# Patient Record
Sex: MALE | Race: BLACK OR AFRICAN AMERICAN | Employment: PART TIME | URBAN - METROPOLITAN AREA
[De-identification: names, ages, dates, MRNs, and addresses within clinical notes are randomized per-mention and may not be internally consistent; named-entity substitution may affect disease eponyms.]

---

## 2022-09-20 VITALS
BODY MASS INDEX: 50.62 KG/M2 | RESPIRATION RATE: 20 BRPM | WEIGHT: 315 LBS | HEIGHT: 66 IN | DIASTOLIC BLOOD PRESSURE: 109 MMHG | HEART RATE: 97 BPM | SYSTOLIC BLOOD PRESSURE: 197 MMHG | TEMPERATURE: 98.2 F | OXYGEN SATURATION: 95 %

## 2022-09-20 PROCEDURE — 99283 EMERGENCY DEPT VISIT LOW MDM: CPT

## 2022-09-20 ASSESSMENT — LIFESTYLE VARIABLES
HOW MANY STANDARD DRINKS CONTAINING ALCOHOL DO YOU HAVE ON A TYPICAL DAY: PATIENT DOES NOT DRINK
HOW OFTEN DO YOU HAVE A DRINK CONTAINING ALCOHOL: NEVER

## 2022-09-20 ASSESSMENT — PAIN - FUNCTIONAL ASSESSMENT: PAIN_FUNCTIONAL_ASSESSMENT: 0-10

## 2022-09-20 ASSESSMENT — PAIN DESCRIPTION - LOCATION: LOCATION: OTHER (COMMENT)

## 2022-09-20 ASSESSMENT — PAIN SCALES - GENERAL: PAINLEVEL_OUTOF10: 10

## 2022-09-20 ASSESSMENT — PAIN DESCRIPTION - ORIENTATION: ORIENTATION: RIGHT

## 2022-09-21 ENCOUNTER — HOSPITAL ENCOUNTER (EMERGENCY)
Age: 42
Discharge: HOME | End: 2022-09-21
Attending: EMERGENCY MEDICINE

## 2022-09-21 DIAGNOSIS — L03.313 CELLULITIS OF CHEST WALL: Primary | ICD-10-CM

## 2022-09-21 PROCEDURE — 6370000000 HC RX 637 (ALT 250 FOR IP): Performed by: EMERGENCY MEDICINE

## 2022-09-21 RX ORDER — SULFAMETHOXAZOLE AND TRIMETHOPRIM 800; 160 MG/1; MG/1
1 TABLET ORAL
Status: COMPLETED | OUTPATIENT
Start: 2022-09-21 | End: 2022-09-21

## 2022-09-21 RX ORDER — CEPHALEXIN 500 MG/1
500 CAPSULE ORAL
Status: COMPLETED | OUTPATIENT
Start: 2022-09-21 | End: 2022-09-21

## 2022-09-21 RX ORDER — SULFAMETHOXAZOLE AND TRIMETHOPRIM 800; 160 MG/1; MG/1
1 TABLET ORAL 2 TIMES DAILY
Qty: 14 TABLET | Refills: 0 | Status: ON HOLD | OUTPATIENT
Start: 2022-09-21 | End: 2022-09-28 | Stop reason: HOSPADM

## 2022-09-21 RX ORDER — CEPHALEXIN 500 MG/1
500 CAPSULE ORAL 4 TIMES DAILY
Qty: 28 CAPSULE | Refills: 0 | Status: ON HOLD | OUTPATIENT
Start: 2022-09-21 | End: 2022-09-28 | Stop reason: HOSPADM

## 2022-09-21 RX ORDER — NAPROXEN 250 MG/1
250 TABLET ORAL
Status: COMPLETED | OUTPATIENT
Start: 2022-09-21 | End: 2022-09-21

## 2022-09-21 RX ADMIN — CEPHALEXIN 500 MG: 500 CAPSULE ORAL at 01:38

## 2022-09-21 RX ADMIN — NAPROXEN 250 MG: 250 TABLET ORAL at 01:38

## 2022-09-21 RX ADMIN — SULFAMETHOXAZOLE AND TRIMETHOPRIM 1 TABLET: 800; 160 TABLET ORAL at 01:38

## 2022-09-21 ASSESSMENT — PAIN SCALES - GENERAL: PAINLEVEL_OUTOF10: 10

## 2022-09-21 ASSESSMENT — PAIN DESCRIPTION - ORIENTATION: ORIENTATION: RIGHT

## 2022-09-21 ASSESSMENT — ENCOUNTER SYMPTOMS
COUGH: 0
COLOR CHANGE: 1
SHORTNESS OF BREATH: 0

## 2022-09-21 ASSESSMENT — PAIN DESCRIPTION - LOCATION: LOCATION: RIB CAGE

## 2022-09-21 NOTE — ED NOTES
I have reviewed discharge instructions with the patient. The patient verbalized understanding. Patient left ED via Discharge Method: ambulatory to Home with family. Opportunity for questions and clarification provided. Patient given 2 scripts. To continue your aftercare when you leave the hospital, you may receive an automated call from our care team to check in on how you are doing. This is a free service and part of our promise to provide the best care and service to meet your aftercare needs.  If you have questions, or wish to unsubscribe from this service please call [phone]. Thank you for Choosing our [facility] Emergency Department.         Derik Arroyo RN  09/21/22 5978

## 2022-09-21 NOTE — ED TRIAGE NOTES
Pt c/o cyst on R armpit going down R side for about 2 days. Pt states he has had cyst removed before.

## 2022-09-21 NOTE — DISCHARGE INSTRUCTIONS
As we discussed, it is important for you to have this area reevaluated in 2 or 3 days. Please follow-up with your primary care doctor or return here to have that done. Please return sooner with any fevers, vomiting, worsening symptoms, or additional concerns.

## 2022-09-21 NOTE — ED PROVIDER NOTES
Emergency Department Provider Note                   PCP:                None Provider               Age: 37 y.o. Sex: male     No diagnosis found. DISPOSITION          MDM  Number of Diagnoses or Management Options  Diagnosis management comments: At this time his exam is most consistent with a cellulitis. Given his obesity I am concerned for possible MRSA so we will treat with a combination of Keflex and Bactrim. No orders of the defined types were placed in this encounter. Medications   cephALEXin (KEFLEX) capsule 500 mg (has no administration in time range)   sulfamethoxazole-trimethoprim (BACTRIM DS;SEPTRA DS) 800-160 MG per tablet 1 tablet (has no administration in time range)   naproxen (NAPROSYN) tablet 250 mg (has no administration in time range)       New Prescriptions    No medications on file        Cecil Milner is a 37 y.o. male who presents to the Emergency Department with chief complaint of    Chief Complaint   Patient presents with    Cyst      81-year-old gentleman presents with concerns about redness and tenderness in the fold under his right chest.  He said he is concerned that he might be developing an abscess. He says with this it is very painful. He has had no fevers. He notes that there is some redness there. He says he does have a history of high blood pressure but no history of diabetes. He says he had previous abscesses in that location. No other associated symptoms. Elements of this note were created using speech recognition software. As such, errors of speech recognition may be present. Review of Systems   Constitutional:  Negative for chills and fever. Respiratory:  Negative for cough and shortness of breath. Musculoskeletal:  Negative for arthralgias, joint swelling and myalgias. Skin:  Positive for color change. Negative for wound.      Past Medical History:   Diagnosis Date    Hypertension         Past Surgical History: Procedure Laterality Date    CYST REMOVAL      TONSILLECTOMY          History reviewed. No pertinent family history. Social History     Socioeconomic History    Marital status: Single     Spouse name: None    Number of children: None    Years of education: None    Highest education level: None   Tobacco Use    Smoking status: Never    Smokeless tobacco: Never   Substance and Sexual Activity    Alcohol use: Never    Drug use: Never         Patient has no known allergies. Previous Medications    No medications on file        Vitals signs and nursing note reviewed. Patient Vitals for the past 4 hrs:   Temp Pulse Resp BP SpO2   09/20/22 2346 98.2 °F (36.8 °C) 97 20 (!) 197/109 95 %          Physical Exam  Vitals and nursing note reviewed. Constitutional:       Appearance: Normal appearance. Cardiovascular:      Rate and Rhythm: Normal rate. Pulmonary:      Effort: Pulmonary effort is normal.   Skin:     Comments: Erythema and tenderness in the fold under his right chest.  I do not feel any abscess, fluctuance, or fluid collection. Neurological:      Mental Status: He is alert. Procedures    No results found for any visits on 09/21/22. No orders to display                       Voice dictation software was used during the making of this note. This software is not perfect and grammatical and other typographical errors may be present. This note has not been completely proofread for errors.         Vickie Banks MD  09/21/22 4740

## 2022-09-22 ENCOUNTER — HOSPITAL ENCOUNTER (INPATIENT)
Age: 42
LOS: 6 days | Discharge: HOME HEALTH | DRG: 871 | End: 2022-09-29
Attending: STUDENT IN AN ORGANIZED HEALTH CARE EDUCATION/TRAINING PROGRAM | Admitting: INTERNAL MEDICINE

## 2022-09-22 ENCOUNTER — HOSPITAL ENCOUNTER (EMERGENCY)
Dept: GENERAL RADIOLOGY | Age: 42
Discharge: HOME | DRG: 871 | End: 2022-09-25

## 2022-09-22 ENCOUNTER — APPOINTMENT (OUTPATIENT)
Dept: CT IMAGING | Age: 42
DRG: 871 | End: 2022-09-22

## 2022-09-22 DIAGNOSIS — L03.311 ABDOMINAL WALL CELLULITIS: Primary | ICD-10-CM

## 2022-09-22 DIAGNOSIS — A41.9 SEPTICEMIA (HCC): ICD-10-CM

## 2022-09-22 DIAGNOSIS — I15.9 SECONDARY HYPERTENSION: ICD-10-CM

## 2022-09-22 DIAGNOSIS — I10 HYPERTENSION, UNSPECIFIED TYPE: ICD-10-CM

## 2022-09-22 LAB
ALBUMIN SERPL-MCNC: 2.9 G/DL (ref 3.5–5)
ALBUMIN/GLOB SERPL: 0.5 {RATIO} (ref 1.2–3.5)
ALP SERPL-CCNC: 78 U/L (ref 50–136)
ALT SERPL-CCNC: 10 U/L (ref 12–65)
ANION GAP SERPL CALC-SCNC: 4 MMOL/L (ref 4–13)
AST SERPL-CCNC: 9 U/L (ref 15–37)
BASOPHILS # BLD: 0 K/UL (ref 0–0.2)
BASOPHILS NFR BLD: 0 % (ref 0–2)
BILIRUB SERPL-MCNC: 1.8 MG/DL (ref 0.2–1.1)
BUN SERPL-MCNC: 17 MG/DL (ref 6–23)
CALCIUM SERPL-MCNC: 8.9 MG/DL (ref 8.3–10.4)
CHLORIDE SERPL-SCNC: 98 MMOL/L (ref 101–110)
CO2 SERPL-SCNC: 33 MMOL/L (ref 21–32)
CREAT SERPL-MCNC: 1.4 MG/DL (ref 0.8–1.5)
DIFFERENTIAL METHOD BLD: ABNORMAL
EKG ATRIAL RATE: 99 BPM
EKG DIAGNOSIS: NORMAL
EKG P AXIS: 55 DEGREES
EKG P-R INTERVAL: 148 MS
EKG Q-T INTERVAL: 378 MS
EKG QRS DURATION: 92 MS
EKG QTC CALCULATION (BAZETT): 485 MS
EKG R AXIS: 96 DEGREES
EKG T AXIS: 10 DEGREES
EKG VENTRICULAR RATE: 99 BPM
EOSINOPHIL # BLD: 0.2 K/UL (ref 0–0.8)
EOSINOPHIL NFR BLD: 1 % (ref 0.5–7.8)
ERYTHROCYTE [DISTWIDTH] IN BLOOD BY AUTOMATED COUNT: 13.9 % (ref 11.9–14.6)
GLOBULIN SER CALC-MCNC: 5.6 G/DL (ref 2.3–3.5)
GLUCOSE SERPL-MCNC: 102 MG/DL (ref 65–100)
HCT VFR BLD AUTO: 42 % (ref 41.1–50.3)
HGB BLD-MCNC: 13.8 G/DL (ref 13.6–17.2)
IMM GRANULOCYTES # BLD AUTO: 0.2 K/UL (ref 0–0.5)
IMM GRANULOCYTES NFR BLD AUTO: 1 % (ref 0–5)
LACTATE SERPL-SCNC: 0.9 MMOL/L (ref 0.4–2)
LYMPHOCYTES # BLD: 1.4 K/UL (ref 0.5–4.6)
LYMPHOCYTES NFR BLD: 7 % (ref 13–44)
MCH RBC QN AUTO: 30.2 PG (ref 26.1–32.9)
MCHC RBC AUTO-ENTMCNC: 32.9 G/DL (ref 31.4–35)
MCV RBC AUTO: 91.9 FL (ref 79.6–97.8)
MONOCYTES # BLD: 1.7 K/UL (ref 0.1–1.3)
MONOCYTES NFR BLD: 9 % (ref 4–12)
NEUTS SEG # BLD: 16.1 K/UL (ref 1.7–8.2)
NEUTS SEG NFR BLD: 82 % (ref 43–78)
NRBC # BLD: 0 K/UL (ref 0–0.2)
PLATELET # BLD AUTO: 276 K/UL (ref 150–450)
PMV BLD AUTO: 9.7 FL (ref 9.4–12.3)
POTASSIUM SERPL-SCNC: 3.2 MMOL/L (ref 3.5–5.1)
PROCALCITONIN SERPL-MCNC: 0.28 NG/ML (ref 0–0.49)
PROT SERPL-MCNC: 8.5 G/DL (ref 6.3–8.2)
RBC # BLD AUTO: 4.57 M/UL (ref 4.23–5.6)
SODIUM SERPL-SCNC: 135 MMOL/L (ref 136–145)
WBC # BLD AUTO: 19.5 K/UL (ref 4.3–11.1)

## 2022-09-22 PROCEDURE — 80053 COMPREHEN METABOLIC PANEL: CPT

## 2022-09-22 PROCEDURE — 85025 COMPLETE CBC W/AUTO DIFF WBC: CPT

## 2022-09-22 PROCEDURE — 84145 PROCALCITONIN (PCT): CPT

## 2022-09-22 PROCEDURE — 2580000003 HC RX 258: Performed by: EMERGENCY MEDICINE

## 2022-09-22 PROCEDURE — 71045 X-RAY EXAM CHEST 1 VIEW: CPT

## 2022-09-22 PROCEDURE — 6360000002 HC RX W HCPCS: Performed by: STUDENT IN AN ORGANIZED HEALTH CARE EDUCATION/TRAINING PROGRAM

## 2022-09-22 PROCEDURE — 96366 THER/PROPH/DIAG IV INF ADDON: CPT

## 2022-09-22 PROCEDURE — 87040 BLOOD CULTURE FOR BACTERIA: CPT

## 2022-09-22 PROCEDURE — 96365 THER/PROPH/DIAG IV INF INIT: CPT

## 2022-09-22 PROCEDURE — 93005 ELECTROCARDIOGRAM TRACING: CPT | Performed by: EMERGENCY MEDICINE

## 2022-09-22 PROCEDURE — 6370000000 HC RX 637 (ALT 250 FOR IP): Performed by: EMERGENCY MEDICINE

## 2022-09-22 PROCEDURE — 83605 ASSAY OF LACTIC ACID: CPT

## 2022-09-22 PROCEDURE — 2580000003 HC RX 258: Performed by: STUDENT IN AN ORGANIZED HEALTH CARE EDUCATION/TRAINING PROGRAM

## 2022-09-22 PROCEDURE — 6360000004 HC RX CONTRAST MEDICATION: Performed by: STUDENT IN AN ORGANIZED HEALTH CARE EDUCATION/TRAINING PROGRAM

## 2022-09-22 PROCEDURE — 99285 EMERGENCY DEPT VISIT HI MDM: CPT

## 2022-09-22 PROCEDURE — 96361 HYDRATE IV INFUSION ADD-ON: CPT

## 2022-09-22 PROCEDURE — 6360000002 HC RX W HCPCS: Performed by: EMERGENCY MEDICINE

## 2022-09-22 PROCEDURE — 71260 CT THORAX DX C+: CPT

## 2022-09-22 PROCEDURE — 96375 TX/PRO/DX INJ NEW DRUG ADDON: CPT

## 2022-09-22 RX ORDER — ACETAMINOPHEN 500 MG
1000 TABLET ORAL
Status: COMPLETED | OUTPATIENT
Start: 2022-09-22 | End: 2022-09-22

## 2022-09-22 RX ORDER — ACETAMINOPHEN 325 MG/1
650 TABLET ORAL
Status: DISCONTINUED | OUTPATIENT
Start: 2022-09-22 | End: 2022-09-22

## 2022-09-22 RX ORDER — 0.9 % SODIUM CHLORIDE 0.9 %
1000 INTRAVENOUS SOLUTION INTRAVENOUS
Status: COMPLETED | OUTPATIENT
Start: 2022-09-22 | End: 2022-09-23

## 2022-09-22 RX ORDER — SODIUM CHLORIDE 0.9 % (FLUSH) 0.9 %
10 SYRINGE (ML) INJECTION
Status: DISCONTINUED | OUTPATIENT
Start: 2022-09-22 | End: 2022-09-23

## 2022-09-22 RX ORDER — HYDROMORPHONE HYDROCHLORIDE 1 MG/ML
1 INJECTION, SOLUTION INTRAMUSCULAR; INTRAVENOUS; SUBCUTANEOUS
Status: COMPLETED | OUTPATIENT
Start: 2022-09-22 | End: 2022-09-22

## 2022-09-22 RX ORDER — 0.9 % SODIUM CHLORIDE 0.9 %
100 INTRAVENOUS SOLUTION INTRAVENOUS
Status: DISCONTINUED | OUTPATIENT
Start: 2022-09-22 | End: 2022-09-23

## 2022-09-22 RX ORDER — ONDANSETRON 2 MG/ML
4 INJECTION INTRAMUSCULAR; INTRAVENOUS
Status: COMPLETED | OUTPATIENT
Start: 2022-09-22 | End: 2022-09-22

## 2022-09-22 RX ORDER — SODIUM CHLORIDE, SODIUM LACTATE, POTASSIUM CHLORIDE, AND CALCIUM CHLORIDE .6; .31; .03; .02 G/100ML; G/100ML; G/100ML; G/100ML
30 INJECTION, SOLUTION INTRAVENOUS ONCE
Status: COMPLETED | OUTPATIENT
Start: 2022-09-22 | End: 2022-09-22

## 2022-09-22 RX ADMIN — ONDANSETRON 4 MG: 2 INJECTION INTRAMUSCULAR; INTRAVENOUS at 23:39

## 2022-09-22 RX ADMIN — IOPAMIDOL 100 ML: 755 INJECTION, SOLUTION INTRAVENOUS at 23:49

## 2022-09-22 RX ADMIN — SODIUM CHLORIDE, POTASSIUM CHLORIDE, SODIUM LACTATE AND CALCIUM CHLORIDE 1914 ML: 600; 310; 30; 20 INJECTION, SOLUTION INTRAVENOUS at 19:24

## 2022-09-22 RX ADMIN — SODIUM CHLORIDE 1000 ML: 9 INJECTION, SOLUTION INTRAVENOUS at 23:38

## 2022-09-22 RX ADMIN — ACETAMINOPHEN 1000 MG: 500 TABLET, FILM COATED ORAL at 19:24

## 2022-09-22 RX ADMIN — HYDROMORPHONE HYDROCHLORIDE 1 MG: 1 INJECTION, SOLUTION INTRAMUSCULAR; INTRAVENOUS; SUBCUTANEOUS at 23:39

## 2022-09-22 RX ADMIN — PIPERACILLIN AND TAZOBACTAM 4500 MG: 4; .5 INJECTION, POWDER, FOR SOLUTION INTRAVENOUS at 19:25

## 2022-09-22 ASSESSMENT — PAIN SCALES - GENERAL
PAINLEVEL_OUTOF10: 10
PAINLEVEL_OUTOF10: 10

## 2022-09-22 NOTE — ED TRIAGE NOTES
Patient ambulatory to triage with mask in place. Patient reports he has an abscess under his left arm. Pt reports he was prescribed ABX 2 days ago and it still has been hurting.

## 2022-09-22 NOTE — ED NOTES
Blood Culture drawn and 2nd staff member assisted (audited) Drawn by Sandra Baer RN witnessed by Brianda Grissom.        Zeke Krishnamurthy RN  09/22/22 5962

## 2022-09-23 PROBLEM — E66.01 MORBID OBESITY WITH BMI OF 70 AND OVER, ADULT (HCC): Chronic | Status: ACTIVE | Noted: 2022-09-23

## 2022-09-23 PROBLEM — L03.111 CELLULITIS OF RIGHT AXILLA: Status: ACTIVE | Noted: 2022-09-23

## 2022-09-23 PROBLEM — A41.9 SEPSIS (HCC): Status: ACTIVE | Noted: 2022-09-23

## 2022-09-23 LAB
ANION GAP SERPL CALC-SCNC: 8 MMOL/L (ref 4–13)
BASOPHILS # BLD: 0 K/UL (ref 0–0.2)
BASOPHILS NFR BLD: 0 % (ref 0–2)
BUN SERPL-MCNC: 11 MG/DL (ref 6–23)
CALCIUM SERPL-MCNC: 8.9 MG/DL (ref 8.3–10.4)
CHLORIDE SERPL-SCNC: 101 MMOL/L (ref 101–110)
CO2 SERPL-SCNC: 27 MMOL/L (ref 21–32)
CREAT SERPL-MCNC: 1.2 MG/DL (ref 0.8–1.5)
DIFFERENTIAL METHOD BLD: ABNORMAL
EOSINOPHIL # BLD: 0 K/UL (ref 0–0.8)
EOSINOPHIL NFR BLD: 0 % (ref 0.5–7.8)
ERYTHROCYTE [DISTWIDTH] IN BLOOD BY AUTOMATED COUNT: 14 % (ref 11.9–14.6)
GLUCOSE SERPL-MCNC: 135 MG/DL (ref 65–100)
HCT VFR BLD AUTO: 45.8 % (ref 41.1–50.3)
HGB BLD-MCNC: 15 G/DL (ref 13.6–17.2)
IMM GRANULOCYTES # BLD AUTO: 0.5 K/UL (ref 0–0.5)
IMM GRANULOCYTES NFR BLD AUTO: 2 % (ref 0–5)
LYMPHOCYTES # BLD: 1 K/UL (ref 0.5–4.6)
LYMPHOCYTES NFR BLD: 4 % (ref 13–44)
MCH RBC QN AUTO: 29.7 PG (ref 26.1–32.9)
MCHC RBC AUTO-ENTMCNC: 32.8 G/DL (ref 31.4–35)
MCV RBC AUTO: 90.7 FL (ref 79.6–97.8)
MONOCYTES # BLD: 2.2 K/UL (ref 0.1–1.3)
MONOCYTES NFR BLD: 9 % (ref 4–12)
NEUTS SEG # BLD: 20.8 K/UL (ref 1.7–8.2)
NEUTS SEG NFR BLD: 85 % (ref 43–78)
NRBC # BLD: 0 K/UL (ref 0–0.2)
PLATELET # BLD AUTO: 224 K/UL (ref 150–450)
PLATELET COMMENT: ADEQUATE
PMV BLD AUTO: 10.2 FL (ref 9.4–12.3)
POTASSIUM SERPL-SCNC: 3.8 MMOL/L (ref 3.5–5.1)
RBC # BLD AUTO: 5.05 M/UL (ref 4.23–5.6)
RBC MORPH BLD: ABNORMAL
SODIUM SERPL-SCNC: 136 MMOL/L (ref 138–145)
WBC # BLD AUTO: 24.5 K/UL (ref 4.3–11.1)
WBC MORPH BLD: ABNORMAL

## 2022-09-23 PROCEDURE — 6360000002 HC RX W HCPCS: Performed by: INTERNAL MEDICINE

## 2022-09-23 PROCEDURE — 6370000000 HC RX 637 (ALT 250 FOR IP): Performed by: INTERNAL MEDICINE

## 2022-09-23 PROCEDURE — 96376 TX/PRO/DX INJ SAME DRUG ADON: CPT

## 2022-09-23 PROCEDURE — 6360000002 HC RX W HCPCS: Performed by: EMERGENCY MEDICINE

## 2022-09-23 PROCEDURE — 85025 COMPLETE CBC W/AUTO DIFF WBC: CPT

## 2022-09-23 PROCEDURE — 2500000003 HC RX 250 WO HCPCS: Performed by: HOSPITALIST

## 2022-09-23 PROCEDURE — 2500000003 HC RX 250 WO HCPCS: Performed by: FAMILY MEDICINE

## 2022-09-23 PROCEDURE — 80048 BASIC METABOLIC PNL TOTAL CA: CPT

## 2022-09-23 PROCEDURE — 96366 THER/PROPH/DIAG IV INF ADDON: CPT

## 2022-09-23 PROCEDURE — 96367 TX/PROPH/DG ADDL SEQ IV INF: CPT

## 2022-09-23 PROCEDURE — 2580000003 HC RX 258: Performed by: INTERNAL MEDICINE

## 2022-09-23 PROCEDURE — 1100000003 HC PRIVATE W/ TELEMETRY

## 2022-09-23 PROCEDURE — 6360000002 HC RX W HCPCS: Performed by: STUDENT IN AN ORGANIZED HEALTH CARE EDUCATION/TRAINING PROGRAM

## 2022-09-23 PROCEDURE — 36415 COLL VENOUS BLD VENIPUNCTURE: CPT

## 2022-09-23 PROCEDURE — 2580000003 HC RX 258: Performed by: FAMILY MEDICINE

## 2022-09-23 PROCEDURE — 2580000003 HC RX 258: Performed by: EMERGENCY MEDICINE

## 2022-09-23 PROCEDURE — 96375 TX/PRO/DX INJ NEW DRUG ADDON: CPT

## 2022-09-23 PROCEDURE — 6360000002 HC RX W HCPCS: Performed by: FAMILY MEDICINE

## 2022-09-23 RX ORDER — ACETAMINOPHEN 325 MG/1
650 TABLET ORAL EVERY 6 HOURS PRN
Status: DISCONTINUED | OUTPATIENT
Start: 2022-09-23 | End: 2022-09-29 | Stop reason: HOSPADM

## 2022-09-23 RX ORDER — HYDROCHLOROTHIAZIDE 25 MG/1
25 TABLET ORAL DAILY
Status: ON HOLD | COMMUNITY
Start: 2021-11-29 | End: 2022-09-29 | Stop reason: HOSPADM

## 2022-09-23 RX ORDER — ONDANSETRON 2 MG/ML
4 INJECTION INTRAMUSCULAR; INTRAVENOUS EVERY 6 HOURS PRN
Status: DISCONTINUED | OUTPATIENT
Start: 2022-09-23 | End: 2022-09-29 | Stop reason: HOSPADM

## 2022-09-23 RX ORDER — SPIRONOLACTONE 25 MG/1
50 TABLET ORAL DAILY
Status: DISCONTINUED | OUTPATIENT
Start: 2022-09-23 | End: 2022-09-29 | Stop reason: HOSPADM

## 2022-09-23 RX ORDER — HYDRALAZINE HYDROCHLORIDE 20 MG/ML
20 INJECTION INTRAMUSCULAR; INTRAVENOUS EVERY 6 HOURS PRN
Status: DISCONTINUED | OUTPATIENT
Start: 2022-09-23 | End: 2022-09-23

## 2022-09-23 RX ORDER — SODIUM CHLORIDE 0.9 % (FLUSH) 0.9 %
5-40 SYRINGE (ML) INJECTION PRN
Status: DISCONTINUED | OUTPATIENT
Start: 2022-09-23 | End: 2022-09-29 | Stop reason: HOSPADM

## 2022-09-23 RX ORDER — POLYETHYLENE GLYCOL 3350 17 G/17G
17 POWDER, FOR SOLUTION ORAL DAILY PRN
Status: DISCONTINUED | OUTPATIENT
Start: 2022-09-23 | End: 2022-09-29 | Stop reason: HOSPADM

## 2022-09-23 RX ORDER — LABETALOL HYDROCHLORIDE 5 MG/ML
10 INJECTION, SOLUTION INTRAVENOUS EVERY 6 HOURS PRN
Status: DISCONTINUED | OUTPATIENT
Start: 2022-09-23 | End: 2022-09-25 | Stop reason: HOSPADM

## 2022-09-23 RX ORDER — ENOXAPARIN SODIUM 100 MG/ML
60 INJECTION SUBCUTANEOUS 2 TIMES DAILY
Status: DISCONTINUED | OUTPATIENT
Start: 2022-09-23 | End: 2022-09-29 | Stop reason: HOSPADM

## 2022-09-23 RX ORDER — HYDROMORPHONE HYDROCHLORIDE 1 MG/ML
1 INJECTION, SOLUTION INTRAMUSCULAR; INTRAVENOUS; SUBCUTANEOUS
Status: COMPLETED | OUTPATIENT
Start: 2022-09-23 | End: 2022-09-23

## 2022-09-23 RX ORDER — HYDROCODONE BITARTRATE AND ACETAMINOPHEN 10; 325 MG/1; MG/1
1 TABLET ORAL EVERY 6 HOURS PRN
Status: DISCONTINUED | OUTPATIENT
Start: 2022-09-23 | End: 2022-09-29 | Stop reason: HOSPADM

## 2022-09-23 RX ORDER — HYDROMORPHONE HYDROCHLORIDE 1 MG/ML
1 INJECTION, SOLUTION INTRAMUSCULAR; INTRAVENOUS; SUBCUTANEOUS EVERY 4 HOURS PRN
Status: DISCONTINUED | OUTPATIENT
Start: 2022-09-23 | End: 2022-09-24

## 2022-09-23 RX ORDER — AMLODIPINE BESYLATE 10 MG/1
10 TABLET ORAL DAILY
Status: DISCONTINUED | OUTPATIENT
Start: 2022-09-23 | End: 2022-09-29 | Stop reason: HOSPADM

## 2022-09-23 RX ORDER — SODIUM CHLORIDE 0.9 % (FLUSH) 0.9 %
5-40 SYRINGE (ML) INJECTION EVERY 12 HOURS SCHEDULED
Status: DISCONTINUED | OUTPATIENT
Start: 2022-09-23 | End: 2022-09-29 | Stop reason: HOSPADM

## 2022-09-23 RX ORDER — ONDANSETRON 4 MG/1
4 TABLET, ORALLY DISINTEGRATING ORAL EVERY 8 HOURS PRN
Status: DISCONTINUED | OUTPATIENT
Start: 2022-09-23 | End: 2022-09-29 | Stop reason: HOSPADM

## 2022-09-23 RX ORDER — LOSARTAN POTASSIUM 50 MG/1
50 TABLET ORAL DAILY
Status: DISCONTINUED | OUTPATIENT
Start: 2022-09-23 | End: 2022-09-28

## 2022-09-23 RX ORDER — AMLODIPINE BESYLATE 10 MG/1
10 TABLET ORAL DAILY
COMMUNITY
Start: 2021-11-29

## 2022-09-23 RX ORDER — ACETAMINOPHEN 650 MG/1
650 SUPPOSITORY RECTAL EVERY 6 HOURS PRN
Status: DISCONTINUED | OUTPATIENT
Start: 2022-09-23 | End: 2022-09-29 | Stop reason: HOSPADM

## 2022-09-23 RX ORDER — POTASSIUM CHLORIDE 20 MEQ/1
20 TABLET, EXTENDED RELEASE ORAL
Status: DISPENSED | OUTPATIENT
Start: 2022-09-23 | End: 2022-09-26

## 2022-09-23 RX ORDER — HYDROCHLOROTHIAZIDE 25 MG/1
25 TABLET ORAL DAILY
Status: DISCONTINUED | OUTPATIENT
Start: 2022-09-23 | End: 2022-09-29

## 2022-09-23 RX ORDER — SODIUM CHLORIDE 9 MG/ML
INJECTION, SOLUTION INTRAVENOUS PRN
Status: DISCONTINUED | OUTPATIENT
Start: 2022-09-23 | End: 2022-09-29 | Stop reason: HOSPADM

## 2022-09-23 RX ADMIN — LOSARTAN POTASSIUM 50 MG: 50 TABLET, FILM COATED ORAL at 08:53

## 2022-09-23 RX ADMIN — HYDROMORPHONE HYDROCHLORIDE 1 MG: 1 INJECTION, SOLUTION INTRAMUSCULAR; INTRAVENOUS; SUBCUTANEOUS at 01:57

## 2022-09-23 RX ADMIN — SODIUM CHLORIDE 2000 MG: 9 INJECTION, SOLUTION INTRAVENOUS at 01:43

## 2022-09-23 RX ADMIN — HYDROMORPHONE HYDROCHLORIDE 1 MG: 1 INJECTION, SOLUTION INTRAMUSCULAR; INTRAVENOUS; SUBCUTANEOUS at 21:19

## 2022-09-23 RX ADMIN — PIPERACILLIN AND TAZOBACTAM 4500 MG: 4; .5 INJECTION, POWDER, LYOPHILIZED, FOR SOLUTION INTRAVENOUS at 18:17

## 2022-09-23 RX ADMIN — ACETAMINOPHEN 650 MG: 325 TABLET ORAL at 21:19

## 2022-09-23 RX ADMIN — TUBERCULIN PURIFIED PROTEIN DERIVATIVE 5 UNITS: 5 INJECTION, SOLUTION INTRADERMAL at 12:41

## 2022-09-23 RX ADMIN — HYDROCHLOROTHIAZIDE 25 MG: 25 TABLET ORAL at 08:52

## 2022-09-23 RX ADMIN — ENOXAPARIN SODIUM 60 MG: 100 INJECTION SUBCUTANEOUS at 09:08

## 2022-09-23 RX ADMIN — POTASSIUM CHLORIDE 20 MEQ: 1500 TABLET, EXTENDED RELEASE ORAL at 18:23

## 2022-09-23 RX ADMIN — POTASSIUM CHLORIDE 20 MEQ: 1500 TABLET, EXTENDED RELEASE ORAL at 12:36

## 2022-09-23 RX ADMIN — HYDRALAZINE HYDROCHLORIDE 20 MG: 20 INJECTION, SOLUTION INTRAMUSCULAR; INTRAVENOUS at 03:20

## 2022-09-23 RX ADMIN — HYDROMORPHONE HYDROCHLORIDE 1 MG: 1 INJECTION, SOLUTION INTRAMUSCULAR; INTRAVENOUS; SUBCUTANEOUS at 12:36

## 2022-09-23 RX ADMIN — AMLODIPINE BESYLATE 10 MG: 10 TABLET ORAL at 08:53

## 2022-09-23 RX ADMIN — HYDROMORPHONE HYDROCHLORIDE 1 MG: 1 INJECTION, SOLUTION INTRAMUSCULAR; INTRAVENOUS; SUBCUTANEOUS at 09:08

## 2022-09-23 RX ADMIN — HYDROCODONE BITARTRATE AND ACETAMINOPHEN 1 TABLET: 10; 325 TABLET ORAL at 15:34

## 2022-09-23 RX ADMIN — SODIUM CHLORIDE, PRESERVATIVE FREE 10 ML: 5 INJECTION INTRAVENOUS at 09:00

## 2022-09-23 RX ADMIN — PIPERACILLIN AND TAZOBACTAM 4500 MG: 4; .5 INJECTION, POWDER, FOR SOLUTION INTRAVENOUS at 13:06

## 2022-09-23 RX ADMIN — SPIRONOLACTONE 50 MG: 25 TABLET ORAL at 08:52

## 2022-09-23 RX ADMIN — POTASSIUM CHLORIDE 20 MEQ: 1500 TABLET, EXTENDED RELEASE ORAL at 08:52

## 2022-09-23 RX ADMIN — SODIUM CHLORIDE, PRESERVATIVE FREE 10 ML: 5 INJECTION INTRAVENOUS at 21:19

## 2022-09-23 RX ADMIN — VANCOMYCIN HYDROCHLORIDE 1250 MG: 10 INJECTION, POWDER, LYOPHILIZED, FOR SOLUTION INTRAVENOUS at 14:23

## 2022-09-23 RX ADMIN — LABETALOL HYDROCHLORIDE 10 MG: 5 INJECTION INTRAVENOUS at 21:18

## 2022-09-23 RX ADMIN — ENOXAPARIN SODIUM 60 MG: 100 INJECTION SUBCUTANEOUS at 21:18

## 2022-09-23 ASSESSMENT — ENCOUNTER SYMPTOMS
COLOR CHANGE: 1
CHEST TIGHTNESS: 0
SHORTNESS OF BREATH: 0
VOMITING: 0
CONSTIPATION: 0
WHEEZING: 0
COUGH: 0
ABDOMINAL DISTENTION: 0
NAUSEA: 0
ABDOMINAL PAIN: 0
BACK PAIN: 0

## 2022-09-23 ASSESSMENT — PAIN DESCRIPTION - LOCATION
LOCATION: OTHER (COMMENT)
LOCATION: BACK
LOCATION: OTHER (COMMENT)
LOCATION: OTHER (COMMENT)
LOCATION: ARM

## 2022-09-23 ASSESSMENT — PAIN SCALES - GENERAL
PAINLEVEL_OUTOF10: 0
PAINLEVEL_OUTOF10: 7
PAINLEVEL_OUTOF10: 2
PAINLEVEL_OUTOF10: 10
PAINLEVEL_OUTOF10: 0
PAINLEVEL_OUTOF10: 8
PAINLEVEL_OUTOF10: 2
PAINLEVEL_OUTOF10: 8

## 2022-09-23 ASSESSMENT — PAIN DESCRIPTION - DESCRIPTORS
DESCRIPTORS: SHARP
DESCRIPTORS: ACHING;DISCOMFORT;SHARP
DESCRIPTORS: SHARP

## 2022-09-23 ASSESSMENT — PAIN - FUNCTIONAL ASSESSMENT: PAIN_FUNCTIONAL_ASSESSMENT: PREVENTS OR INTERFERES SOME ACTIVE ACTIVITIES AND ADLS

## 2022-09-23 ASSESSMENT — PAIN DESCRIPTION - ORIENTATION
ORIENTATION: RIGHT;OUTER
ORIENTATION: RIGHT

## 2022-09-23 NOTE — CARE COORDINATION
Pt presented with a fever and R upper extremity pain. Patient reported that on 9/18/22 he noticed cysts with pain appearing in his right axilla with radiating pain toward his breast area. At that time he was evaluated at [place] ED and was found to have cellulitis. While at home, however, he spiked a fever and the pain intensified despite taking abx. Has a PMHx of morbid obesity, previous cellulitis, depression and HTN. Pt lives with his mother, sister and her children in a single story home. At baseline, is indep with his ADLs. Works [address] [employer]. On RA. Denies current home care services. Denies have a PCP and is self pay. CM will provided resources to NH and the [facility], in addition to [facility] for future medications. Will need the paper scripts at discharge to vouch the pts medications. Will remain available. 09/23/22 3316   Service Assessment   Patient Orientation Alert and Oriented   Cognition Alert   History Provided By Patient   Primary [address] Parent; Family Members;[employer];Friends/Neighbors   PCP Verified by CM No  (WIll provide info on NH and [facility])   Prior Functional Level Independent in ADLs/IADLs   Current Functional Level Independent in ADLs/IADLs   Can patient return to prior living arrangement Yes   Ability to make needs known: Good   Family able to assist with home care needs: Yes   CM/SW Referral No PCP   Social/Functional History   Lives With Family   Type of [address] One level   ADL Assistance Independent   Homemaking Assistance Independent   Ambulation Assistance Independent   Transfer Assistance Independent   Active  Yes   Mode of Transportation Car   Occupation Full time employment   Discharge Planning   Type of [place] Family Members   Current Services Prior To Admission None   Potential Assistance Needed N/A   DME Ordered?  No   Potential Assistance Purchasing Medications Yes  (Pt is self pay) Type of Home Care Services None   Patient expects to be discharged to: [facility] Discharge   Transition of Care Consult (CM Consult) Discharge [address] Discharge None   [employer] Information Provided?  No   Mode of Transport at Discharge Other (see comment)  (Family)   Confirm Follow Up Transport Self

## 2022-09-23 NOTE — ED NOTES
TRANSFER - OUT REPORT:    Verbal report given to [address] [place] [address]  on [employer]  being transferred to [facility]  for routine progression of patient care       Report consisted of patient's Situation, Background, Assessment and   Recommendations(SBAR). Information from the following report(s) ED SBAR, [facility] and Recent Results was reviewed with the receiving nurse. Lines:   Peripheral IV 09/22/22 Right Antecubital (Active)        Opportunity for questions and clarification was provided.       Patient transported with:       Bakari Ochoa, KARIS  09/23/22 7498

## 2022-09-23 NOTE — PROGRESS NOTES
TRANSFER - IN REPORT:    Verbal report received from [facility] on [employer]  being received from ED for routine progression of patient care      Report consisted of patient's Situation, Background, Assessment and   Recommendations(SBAR). Information from the following report(s) Nurse Handoff Report, ED Encounter Summary, ED SBAR, Intake/Output, MAR, and Recent Results was reviewed with the receiving nurse. Opportunity for questions and clarification was provided. Assessment completed upon patient's arrival to unit and care assumed.

## 2022-09-23 NOTE — PROGRESS NOTES
81-year-old man with past medical history significant for morbid obesity, depression and hypertension admitted with sepsis secondary to right upper extremity cellulitis earlier this morning. Patient started on empiric antibiotics. Follow-up on cultures. Pain control. Continue rest of the management. No bill charged for this visit.

## 2022-09-23 NOTE — ED PROVIDER NOTES
Emergency Department Provider Note                   PCP:                None Provider               Age: 37 y.o. Sex: male     No diagnosis found. DISPOSITION          MDM  Number of Diagnoses or Management Options  Abdominal wall cellulitis: new, needed workup  Septicemia [facility]): new, needed workup  Diagnosis management comments: Patient meeting sepsis criteria on arrival with a fever of 102, tachycardia and now white count. Sepsis markers however are stable including both procalcitonin and lactic acid. Patient is received broad-spectrum antibiotics and fluid bolus in this department. I will obtain CT imaging to further evaluate the area the patient will require admission given his laboratory abnormalities and failure of outpatient antibiotic therapy. CT imaging shows evidence of cellulitis of the area of concern on the right side. No focal abscess. Will speak to the hospitalist for admission. Notified patient of plan of care. Voices agreement and understanding with this plan. Critical care time: 36 minutes of critical care time was performed in the emergency department. This was separate from any other procedures listed during the patients emergency department course. The failure to initiate these interventions on an urgent basis would likely have resulted in sudden, clinically significant or life-threatening deterioration in the patients condition.          Amount and/or Complexity of Data Reviewed  Clinical lab tests: ordered and reviewed  Tests in the radiology section of CPT®: reviewed and ordered  Tests in the medicine section of CPT®: ordered and reviewed  Discuss the patient with other providers: yes  Independent visualization of images, tracings, or specimens: yes    Risk of Complications, Morbidity, and/or Mortality  Presenting problems: high  Diagnostic procedures: high  Management options: high    Patient Progress  Patient progress: stable             Orders Placed This Encounter Procedures    Culture, Blood 1    Culture, Blood 1    XR CHEST PORTABLE    CT CHEST ABDOMEN PELVIS W CONTRAST    Lactic Acid    CBC with Auto Differential    CMP    Procalcitonin    Cardiac Monitor - ED Only    EKG 12 Lead    Saline lock IV        Medications   vancomycin (VANCOCIN) 2000 mg in 0.9% sodium chloride 500 mL IVPB (has no administration in time range)   0.9 % sodium chloride bolus (1,000 mLs IntraVENous New Bag 9/22/22 2338)   0.9 % sodium chloride bolus (has no administration in time range)   sodium chloride flush 0.9 % injection 10 mL (has no administration in time range)   iopamidol (ISOVUE-370) 76 % injection 100 mL (has no administration in time range)   acetaminophen (TYLENOL) tablet 1,000 mg (1,000 mg Oral Given 9/22/22 1924)   piperacillin-tazobactam (ZOSYN) 4,500 mg in sodium chloride 0.9 % 100 mL IVPB (mini-bag) (0 mg IntraVENous Stopped 9/22/22 2233)   lactated ringers bolus (0 mL/kg × 63.8 kg (Ideal) IntraVENous Stopped 9/22/22 2233)   HYDROmorphone HCl PF (DILAUDID) injection 1 mg (1 mg IntraVENous Given 9/22/22 2339)   ondansetron (ZOFRAN) injection 4 mg (4 mg IntraVENous Given 9/22/22 2339)       New Prescriptions    No medications on file        Holly Marie is a 37 y.o. male who presents to the Emergency Department with chief complaint of    Chief Complaint   Patient presents with    Abscess      45-year-old male patient presents to this department with reports of suspected abscess to the right chest wall/upper abdomen. Patient states he noted this area start to form over the past 4 days. He was apparently seen at [address] [place] [address] for these symptoms and prescribed antibiotics which she has been taking for the past several days. He reports fever today, arrives with a fever of 102. He denies nausea, vomiting. He reports severe, constant pain over the area. He reports a history of multiple abscesses requiring surgical incision and drainage in the OR.   Patient denies history of diabetes. He reports no other similar areas of concern currently. The history is provided by the patient and a relative. No  was used. Review of Systems   Constitutional:  Positive for fever. Negative for chills and fatigue. HENT:  Negative for congestion. Eyes:  Negative for visual disturbance. Respiratory:  Negative for cough, chest tightness, shortness of breath and wheezing. Cardiovascular:  Negative for chest pain and leg swelling. Gastrointestinal:  Negative for abdominal distention, abdominal pain, constipation, nausea and vomiting. Genitourinary:  Negative for difficulty urinating, dysuria, flank pain and hematuria. Musculoskeletal:  Negative for back pain, neck pain and neck stiffness. Skin:  Positive for color change and wound. Neurological:  Negative for dizziness, light-headedness, numbness and headaches. All other systems reviewed and are negative. Past Medical History:   Diagnosis Date    Hypertension         Past Surgical History:   Procedure Laterality Date    CYST REMOVAL      TONSILLECTOMY          No family history on file. Social History     Socioeconomic History    Marital status: Single   Tobacco Use    Smoking status: Never    Smokeless tobacco: Never   Substance and Sexual Activity    Alcohol use: Never    Drug use: Never         Patient has no known allergies. Previous Medications    CEPHALEXIN (KEFLEX) 500 MG CAPSULE    Take 1 capsule by mouth 4 times daily for 7 days    SULFAMETHOXAZOLE-TRIMETHOPRIM (BACTRIM DS;SEPTRA DS) 800-160 MG PER TABLET    Take 1 tablet by mouth 2 times daily for 7 days        Vitals signs and nursing note reviewed. No data found. Physical Exam  Vitals and nursing note reviewed. Constitutional:       General: He is not in acute distress. Appearance: Normal appearance. He is normal weight. He is not ill-appearing or toxic-appearing.       Comments: Generally well-appearing, alert and oriented x4. No acute distress, speaks in clear, fluid sentences. HENT:      Head: Normocephalic and atraumatic. Right Ear: External ear normal.      Left Ear: External ear normal.      Nose: Nose normal.      Mouth/Throat:      Mouth: Mucous membranes are moist.   Eyes:      General: No scleral icterus. Right eye: No discharge. Left eye: No discharge. Extraocular Movements: Extraocular movements intact. Cardiovascular:      Rate and Rhythm: Normal rate and regular rhythm. Pulses: Normal pulses. Heart sounds: Normal heart sounds. Pulmonary:      Effort: Pulmonary effort is normal. No tachypnea, bradypnea, accessory muscle usage, prolonged expiration or respiratory distress. Breath sounds: Normal breath sounds and air entry. No stridor. No decreased breath sounds, wheezing, rhonchi or rales. Abdominal:      General: Abdomen is flat. There is no distension. Palpations: There is no mass. Tenderness: There is no abdominal tenderness. There is no right CVA tenderness, left CVA tenderness, guarding or rebound. Negative signs include Howard's sign and McBurney's sign. Hernia: No hernia is present. Comments: There is a warm, indurated area with palpable firmness under the right breast between a large skin fold at the top of the patient's abdomen on the right side. Some guarding with palpation of this area. No palpable fluctuance or active drainage present. Remainder of abdominal exam is unremarkable. Musculoskeletal:         General: No swelling, tenderness or deformity. Normal range of motion. Cervical back: Normal range of motion. Skin:     General: Skin is warm. Capillary Refill: Capillary refill takes less than 2 seconds. Neurological:      General: No focal deficit present. Mental Status: He is alert.    Psychiatric:         Mood and Affect: Mood normal.        Procedures    Results for orders placed or performed during the hospital encounter of 09/22/22   XR CHEST PORTABLE    Narrative    EXAM: Chest x-ray. INDICATION: Dyspnea. COMPARISON: None. TECHNIQUE: Single frontal view. FINDINGS: The lungs are clear. The cardiac size, mediastinal contour and  pulmonary vasculature are within normal limits for the portable technique. No  pneumothorax or pleural effusion is seen. The bones are intact. Impression    No acute process.    Lactic Acid   Result Value Ref Range    Lactic Acid, Plasma 0.9 0.4 - 2.0 MMOL/L   CBC with Auto Differential   Result Value Ref Range    WBC 19.5 (H) 4.3 - 11.1 K/uL    RBC 4.57 4.23 - 5.6 M/uL    Hemoglobin 13.8 13.6 - 17.2 g/dL    Hematocrit 42.0 41.1 - 50.3 %    MCV 91.9 79.6 - 97.8 FL    MCH 30.2 26.1 - 32.9 PG    MCHC 32.9 31.4 - 35.0 g/dL    RDW 13.9 11.9 - 14.6 %    Platelets 015 843 - 513 K/uL    MPV 9.7 9.4 - 12.3 FL    nRBC 0.00 0.0 - 0.2 K/uL    Differential Type AUTOMATED      Seg Neutrophils 82 (H) 43 - 78 %    Lymphocytes 7 (L) 13 - 44 %    Monocytes 9 4.0 - 12.0 %    Eosinophils % 1 0.5 - 7.8 %    Basophils 0 0.0 - 2.0 %    Immature Granulocytes 1 0.0 - 5.0 %    Segs Absolute 16.1 (H) 1.7 - 8.2 K/UL    Absolute Lymph # 1.4 0.5 - 4.6 K/UL    Absolute Mono # 1.7 (H) 0.1 - 1.3 K/UL    Absolute Eos # 0.2 0.0 - 0.8 K/UL    Basophils Absolute 0.0 0.0 - 0.2 K/UL    Absolute Immature Granulocyte 0.2 0.0 - 0.5 K/UL   CMP   Result Value Ref Range    Sodium 135 (L) 136 - 145 mmol/L    Potassium 3.2 (L) 3.5 - 5.1 mmol/L    Chloride 98 (L) 101 - 110 mmol/L    CO2 33 (H) 21 - 32 mmol/L    Anion Gap 4 4 - 13 mmol/L    Glucose 102 (H) 65 - 100 mg/dL    BUN 17 6 - 23 MG/DL    Creatinine 1.40 0.8 - 1.5 MG/DL    GFR African American >60 >60 ml/min/1.73m2    GFR Non- 60 (L) >60 ml/min/1.73m2    Calcium 8.9 8.3 - 10.4 MG/DL    Total Bilirubin 1.8 (H) 0.2 - 1.1 MG/DL    ALT 10 (L) 12 - 65 U/L    AST 9 (L) 15 - 37 U/L    Alk Phosphatase 78 50 - 136 U/L    Total Protein 8.5 (H) 6.3 - 8.2 g/dL    Albumin 2.9 (L) 3.5 - 5.0 g/dL    Globulin 5.6 (H) 2.3 - 3.5 g/dL    Albumin/Globulin Ratio 0.5 (L) 1.2 - 3.5     Procalcitonin   Result Value Ref Range    Procalcitonin 0.28 0.00 - 0.49 ng/mL   EKG 12 Lead   Result Value Ref Range    Ventricular Rate 99 BPM    Atrial Rate 99 BPM    P-R Interval 148 ms    QRS Duration 92 ms    Q-T Interval 378 ms    QTc Calculation (Bazett) 485 ms    P Axis 55 degrees    R Axis 96 degrees    T Axis 10 degrees    Diagnosis Sinus rhythm with Premature atrial complexes         XR CHEST PORTABLE   Final Result   No acute process. CT CHEST ABDOMEN PELVIS W CONTRAST    (Results Pending)                       Voice dictation software was used during the making of this note. This software is not perfect and grammatical and other typographical errors may be present. This note has not been completely proofread for errors.      Destiny Horner, DO  09/23/22 0225

## 2022-09-23 NOTE — H&P
Hospitalist History and Physical   Admit Date:  2022 10:00 PM   Name:  Mychal Pyle   Age:  37 y.o. Sex:  male  :  1980   MRN:  [id number]   Room:  ER13/13    Presenting Complaint: Abscess     Reason(s) for Admission: No admission diagnoses are documented for this encounter. History of Present Illness:   Mychal Pyle is a 37 y.o. male with medical history of Morbid obesity, previous cellulitis, depression and HTN who presented with fever and right upper extremity pain. Patient states that on 22 he started to notice cysts with pain appearing in his right axilla with the pain radiating down the right side toward his breast.  He was evaluated at [facility] and was found to have cellulitis. He was given keflex and bactrim for treatment. While at home however he spiked a fever and the pain intensified despite taking the antibiotics. He denies chest pain, sob, nausea and vomiting. Review of Systems:  10 systems reviewed and negative except as noted in HPI. Assessment & Plan:     Principal Problem:    Sepsis ([address]  Plan: Due to cellulitis. Will place on iv Vanc for treatment  Active Problems:    Hypokalemia  Plan: will replace po and check a Mg2+ level prn    Depression  Plan: mood stable    Essential hypertension  Plan: Will continue his aldactone, norvasc, cozaar and HCTZ    Morbid obesity with BMI of 70 and over, adult ([address]  Plan: lifestyle modifications encouraged    Cellulitis of right axilla  Plan: iv farrell      Anticipated discharge needs:     None    Diet: Regular  VTE ppx: lovenox  Code status: Full code    Hospital Problems:  Principal Problem:    Sepsis ([address]  Active Problems:    Depression    Essential hypertension    Morbid obesity with BMI of 70 and over, adult ([address]    Cellulitis of right axilla  Resolved Problems:    * No resolved hospital problems.  *       Past History:     Past Medical History:   Diagnosis Date    Hypertension        Past Surgical History:   Procedure Laterality Date    CYST REMOVAL      TONSILLECTOMY          Social History     Tobacco Use    Smoking status: Never    Smokeless tobacco: Never   Substance Use Topics    Alcohol use: Never      Social History     Substance and Sexual Activity   Drug Use Never       No family history on file. Immunization History   Administered Date(s) Administered    COVID-19, PFIZER PURPLE top, DILUTE for use, (age 15 y+), 30mcg/0.3mL 09/01/2021, 09/22/2021     No Known Allergies  Prior to Admit Medications:  Current Outpatient Medications   Medication Instructions    cephALEXin (KEFLEX) 500 mg, Oral, 4 TIMES DAILY    sulfamethoxazole-trimethoprim (BACTRIM DS;SEPTRA DS) 800-160 MG per tablet 1 tablet, Oral, 2 TIMES DAILY         Objective:   Patient Vitals for the past 24 hrs:   Temp Pulse Resp BP SpO2   09/23/22 0336 -- -- -- -- 97 %   09/23/22 0316 -- -- -- (!) 194/120 93 %   09/23/22 0314 98.2 °F (36.8 °C) -- -- -- --   09/23/22 0245 -- -- -- (!) 207/106 90 %   09/23/22 0232 -- -- -- (!) 193/116 97 %   09/23/22 0217 -- -- -- (!) 184/106 (!) 85 %   09/23/22 0202 -- -- -- (!) 170/100 (!) 89 %   09/23/22 0156 -- 91 16 (!) 191/98 91 %   09/23/22 0132 -- -- -- (!) 173/104 92 %   09/23/22 0130 -- 92 14 (!) 176/100 92 %   09/23/22 0042 -- 90 16 (!) 160/90 92 %   09/22/22 2312 -- -- -- (!) 178/90 95 %   09/22/22 2302 -- -- -- (!) 175/92 (!) 88 %   09/22/22 1903 (!) 102.2 °F (39 °C) (!) 105 18 (!) 190/115 95 %       Oxygen Therapy  SpO2: 97 %    Estimated body mass index is 77.47 kg/m² as calculated from the following:    Height as of this encounter: 5' 6\" (1.676 m). Weight as of this encounter: 480 lb (217.7 kg). No intake or output data in the 24 hours ending 09/23/22 0358      Physical Exam:    Blood pressure (!) 194/120, pulse 91, temperature 98.2 °F (36.8 °C), resp. rate 16, height 5' 6\" (1.676 m), weight (!) 480 lb (217.7 kg), SpO2 97 %. General:    Well nourished.   Morbidly obese  Head:  Normocephalic, atraumatic  Eyes:  Sclerae appear normal.  Pupils equally round. ENT:  Nares appear normal, no drainage. Moist oral mucosa  Neck:  No restricted ROM. Trachea midline   CV:   RRR. No m/r/g. No jugular venous distension. Lungs:   CTAB. No wheezing, rhonchi, or rales. Symmetric expansion. Abdomen: Bowel sounds present. Soft, nontender, nondistended. Extremities: Right axilla erythematous and ttp  Skin:     No rashes and normal coloration. Warm and dry. Neuro:  CN II-XII grossly intact. Sensation intact. A&Ox3  Psych:  Normal mood and affect.       I have personally reviewed labs and tests showing:  Recent Labs:  Recent Results (from the past 24 hour(s))   Lactic Acid    Collection Time: 09/22/22  7:30 PM   Result Value Ref Range    Lactic Acid, Plasma 0.9 0.4 - 2.0 MMOL/L   CBC with Auto Differential    Collection Time: 09/22/22  7:30 PM   Result Value Ref Range    WBC 19.5 (H) 4.3 - 11.1 K/uL    RBC 4.57 4.23 - 5.6 M/uL    Hemoglobin 13.8 13.6 - 17.2 g/dL    Hematocrit 42.0 41.1 - 50.3 %    MCV 91.9 79.6 - 97.8 FL    MCH 30.2 26.1 - 32.9 PG    MCHC 32.9 31.4 - 35.0 g/dL    RDW 13.9 11.9 - 14.6 %    Platelets 506 982 - 808 K/uL    MPV 9.7 9.4 - 12.3 FL    nRBC 0.00 0.0 - 0.2 K/uL    Differential Type AUTOMATED      Seg Neutrophils 82 (H) 43 - 78 %    Lymphocytes 7 (L) 13 - 44 %    Monocytes 9 4.0 - 12.0 %    Eosinophils % 1 0.5 - 7.8 %    Basophils 0 0.0 - 2.0 %    Immature Granulocytes 1 0.0 - 5.0 %    Segs Absolute 16.1 (H) 1.7 - 8.2 K/UL    Absolute Lymph # 1.4 0.5 - 4.6 K/UL    Absolute Mono # 1.7 (H) 0.1 - 1.3 K/UL    Absolute Eos # 0.2 0.0 - 0.8 K/UL    Basophils Absolute 0.0 0.0 - 0.2 K/UL    Absolute Immature Granulocyte 0.2 0.0 - 0.5 K/UL   CMP    Collection Time: 09/22/22  7:30 PM   Result Value Ref Range    Sodium 135 (L) 136 - 145 mmol/L    Potassium 3.2 (L) 3.5 - 5.1 mmol/L    Chloride 98 (L) 101 - 110 mmol/L    CO2 33 (H) 21 - 32 mmol/L    Anion Gap 4 4 - 13 mmol/L    Glucose 102 (H) 65 - 100 mg/dL    BUN 17 6 - 23 MG/DL    Creatinine 1.40 0.8 - 1.5 MG/DL    GFR African American >60 >60 ml/min/1.73m2    GFR Non- 60 (L) >60 ml/min/1.73m2    Calcium 8.9 8.3 - 10.4 MG/DL    Total Bilirubin 1.8 (H) 0.2 - 1.1 MG/DL    ALT 10 (L) 12 - 65 U/L    AST 9 (L) 15 - 37 U/L    Alk Phosphatase 78 50 - 136 U/L    Total Protein 8.5 (H) 6.3 - 8.2 g/dL    Albumin 2.9 (L) 3.5 - 5.0 g/dL    Globulin 5.6 (H) 2.3 - 3.5 g/dL    Albumin/Globulin Ratio 0.5 (L) 1.2 - 3.5     Procalcitonin    Collection Time: 09/22/22  7:30 PM   Result Value Ref Range    Procalcitonin 0.28 0.00 - 0.49 ng/mL   EKG 12 Lead    Collection Time: 09/22/22  7:30 PM   Result Value Ref Range    Ventricular Rate 99 BPM    Atrial Rate 99 BPM    P-R Interval 148 ms    QRS Duration 92 ms    Q-T Interval 378 ms    QTc Calculation (Bazett) 485 ms    P Axis 55 degrees    R Axis 96 degrees    T Axis 10 degrees    Diagnosis Sinus rhythm with Premature atrial complexes        I have personally reviewed imaging studies showing:  XR CHEST PORTABLE    Result Date: 9/22/2022  EXAM: Chest x-ray. INDICATION: Dyspnea. COMPARISON: None. TECHNIQUE: Single frontal view. FINDINGS: The lungs are clear. The cardiac size, mediastinal contour and pulmonary vasculature are within normal limits for the portable technique. No pneumothorax or pleural effusion is seen. The bones are intact. No acute process. CT CHEST ABDOMEN PELVIS W CONTRAST    Result Date: 9/22/2022  EXAM: CT chest, abdomen and pelvis with IV contrast. INDICATION: Pain. COMPARISON: None. TECHNIQUE: Axial CT images of the chest, abdomen and pelvis were obtained after the intravenous injection of 100 mL Isovue 370 CT contrast. Radiation dose reduction techniques were used for this study. Our CT scanners use one or all of the following:  Automated exposure control, adjustment of the mA and/or kV according to patient size, iterative reconstruction.  FINDINGS: - Pleura/pericardium: Within normal limits. - Lungs: Within normal limits. - Selin/Mediastinum: Within normal limits. - Tracheobronchial tree: Within normal limits. - Aorta/pulmonary arteries: Within normal limits. - Heart: Within normal limits. - Coronary arteries: No coronary artery calcifications. - Chest wall: There is edema in the anterolateral right chest wall subcutaneous tissue, with skin thickening along the axilla and a few mildly enlarged right axillary lymph nodes measuring up to 4.1 cm in diameter. No soft tissue gas or fluid collection is seen. - Spine/bones: No acute process. - Additional comments: None. - Liver: Within normal limits. - Gallbladder and bile ducts: Within normal limits. - Spleen: Within normal limits. - Urinary tract: Within normal limits. - Adrenals: Within normal limits. - Pancreas: Within normal limits. - Gastrointestinal tract: Within normal limits. - Retroperitoneum: Within normal limits. - Peritoneal cavity and abdominal wall: Within normal limits. - Pelvis: There is a small fat-containing right inguinal hernia. - Spine/bones: No acute process. - Other comments: None. 1. Changes suggestive of cellulitis in the right axilla and adjacent chest wall. 2. No acute intra-abdominal or intrathoracic process. Echocardiogram:  No results found for this or any previous visit.         Orders Placed This Encounter   Medications    DISCONTD: acetaminophen (TYLENOL) tablet 650 mg    acetaminophen (TYLENOL) tablet 1,000 mg    piperacillin-tazobactam (ZOSYN) 4,500 mg in sodium chloride 0.9 % 100 mL IVPB (mini-bag)     Order Specific Question:   Antimicrobial Indications     Answer:   Skin and Soft Tissue Infection    vancomycin (VANCOCIN) 2000 mg in 0.9% sodium chloride 500 mL IVPB     Order Specific Question:   Antimicrobial Indications     Answer:   Skin and Soft Tissue Infection    lactated ringers bolus    HYDROmorphone HCl PF (DILAUDID) injection 1 mg    ondansetron (ZOFRAN) injection 4 mg    0.9 % sodium chloride bolus    0.9 % sodium chloride bolus    sodium chloride flush 0.9 % injection 10 mL    iopamidol (ISOVUE-370) 76 % injection 100 mL    HYDROmorphone HCl PF (DILAUDID) injection 1 mg    losartan (COZAAR) tablet 50 mg    hydrALAZINE (APRESOLINE) injection 20 mg         Signed:  Mirza Nagel MD    Part of this note may have been written by using a voice dictation software. The note has been proof read but may still contain some grammatical/other typographical errors.

## 2022-09-23 NOTE — PROGRESS NOTES
VANCO DAILY FOLLOW UP NOTE  [facility] Pharmacokinetic Monitoring Service - Vancomycin    Consulting Provider: Marva Henriquez   Indication: SSTI  Target Concentration: Goal trough of 10-15 mg/L and AUC/CARLO <500 mg*hr/L  Day of Therapy: 1 of 5  Additional Antimicrobials: none    Pertinent Laboratory Values: Wt Readings from Last 1 Encounters:   09/22/22 (!) 480 lb (217.7 kg)     Temp Readings from Last 1 Encounters:   09/23/22 98.3 °F (36.8 °C) (Oral)     Recent Labs     09/22/22  1930 09/23/22  0716   BUN 17 11   CREATININE 1.40 1.20   WBC 19.5* 24.5*   PROCAL 0.28  --    LACACIDPL 0.9  --      Estimated Creatinine Clearance: 145 mL/min (based on SCr of 1.2 mg/dL). No results found for: Iman Albert    MRSA Nasal Swab: N/A. Non-respiratory infection.       Assessment:  Date/Time Dose Concentration AUC         Note: Serum concentrations collected for AUC dosing may appear elevated if collected in close proximity to the dose administered, this is not necessarily an indication of toxicity    Plan:  Dosing recommendations based on Bayesian software  Start vancomycin 2000 mg x 1, followed by 1250 mg q 12h  Anticipated AUC of 466 and trough concentration of 12 at steady state  Renal labs as indicated   Vancomycin concentration ordered for 9/24 @ 0900    Pharmacy will continue to monitor patient and adjust therapy as indicated    Thank you for the consult,  Eloisa Webster [facility]

## 2022-09-24 LAB
ANION GAP SERPL CALC-SCNC: 2 MMOL/L (ref 4–13)
BACTERIA SPEC CULT: ABNORMAL
BASOPHILS # BLD: 0 K/UL (ref 0–0.2)
BASOPHILS NFR BLD: 0 % (ref 0–2)
BUN SERPL-MCNC: 11 MG/DL (ref 6–23)
CALCIUM SERPL-MCNC: 8.8 MG/DL (ref 8.3–10.4)
CHLORIDE SERPL-SCNC: 98 MMOL/L (ref 101–110)
CO2 SERPL-SCNC: 34 MMOL/L (ref 21–32)
CREAT SERPL-MCNC: 1.2 MG/DL (ref 0.8–1.5)
DIFFERENTIAL METHOD BLD: ABNORMAL
EOSINOPHIL # BLD: 0.1 K/UL (ref 0–0.8)
EOSINOPHIL NFR BLD: 1 % (ref 0.5–7.8)
ERYTHROCYTE [DISTWIDTH] IN BLOOD BY AUTOMATED COUNT: 14 % (ref 11.9–14.6)
GLUCOSE SERPL-MCNC: 128 MG/DL (ref 65–100)
HCT VFR BLD AUTO: 38.3 % (ref 41.1–50.3)
HGB BLD-MCNC: 12.2 G/DL (ref 13.6–17.2)
IMM GRANULOCYTES # BLD AUTO: 0.2 K/UL (ref 0–0.5)
IMM GRANULOCYTES NFR BLD AUTO: 1 % (ref 0–5)
LACTATE SERPL-SCNC: 1 MMOL/L (ref 0.4–2)
LYMPHOCYTES # BLD: 1.3 K/UL (ref 0.5–4.6)
LYMPHOCYTES NFR BLD: 6 % (ref 13–44)
MAGNESIUM SERPL-MCNC: 1.8 MG/DL (ref 1.8–2.4)
MCH RBC QN AUTO: 29.6 PG (ref 26.1–32.9)
MCHC RBC AUTO-ENTMCNC: 31.9 G/DL (ref 31.4–35)
MCV RBC AUTO: 93 FL (ref 79.6–97.8)
MM INDURATION, POC: 0 MM (ref 0–5)
MONOCYTES # BLD: 2.5 K/UL (ref 0.1–1.3)
MONOCYTES NFR BLD: 12 % (ref 4–12)
NEUTS SEG # BLD: 17.2 K/UL (ref 1.7–8.2)
NEUTS SEG NFR BLD: 81 % (ref 43–78)
NRBC # BLD: 0 K/UL (ref 0–0.2)
PLATELET # BLD AUTO: 298 K/UL (ref 150–450)
PMV BLD AUTO: 9.7 FL (ref 9.4–12.3)
POTASSIUM SERPL-SCNC: 3.4 MMOL/L (ref 3.5–5.1)
PPD, POC: NEGATIVE
RBC # BLD AUTO: 4.12 M/UL (ref 4.23–5.6)
SARS-COV-2 RDRP RESP QL NAA+PROBE: NOT DETECTED
SERVICE CMNT-IMP: ABNORMAL
SODIUM SERPL-SCNC: 134 MMOL/L (ref 138–145)
SOURCE: NORMAL
VANCOMYCIN SERPL-MCNC: 9.3 UG/ML
WBC # BLD AUTO: 21.3 K/UL (ref 4.3–11.1)

## 2022-09-24 PROCEDURE — 6360000002 HC RX W HCPCS: Performed by: FAMILY MEDICINE

## 2022-09-24 PROCEDURE — 97165 OT EVAL LOW COMPLEX 30 MIN: CPT

## 2022-09-24 PROCEDURE — 83605 ASSAY OF LACTIC ACID: CPT

## 2022-09-24 PROCEDURE — 80202 ASSAY OF VANCOMYCIN: CPT

## 2022-09-24 PROCEDURE — 97530 THERAPEUTIC ACTIVITIES: CPT

## 2022-09-24 PROCEDURE — 87641 MR-STAPH DNA AMP PROBE: CPT

## 2022-09-24 PROCEDURE — 85025 COMPLETE CBC W/AUTO DIFF WBC: CPT

## 2022-09-24 PROCEDURE — 1100000003 HC PRIVATE W/ TELEMETRY

## 2022-09-24 PROCEDURE — 2500000003 HC RX 250 WO HCPCS: Performed by: HOSPITALIST

## 2022-09-24 PROCEDURE — 6370000000 HC RX 637 (ALT 250 FOR IP): Performed by: FAMILY MEDICINE

## 2022-09-24 PROCEDURE — 97161 PT EVAL LOW COMPLEX 20 MIN: CPT

## 2022-09-24 PROCEDURE — 36415 COLL VENOUS BLD VENIPUNCTURE: CPT

## 2022-09-24 PROCEDURE — 97535 SELF CARE MNGMENT TRAINING: CPT

## 2022-09-24 PROCEDURE — 2500000003 HC RX 250 WO HCPCS: Performed by: FAMILY MEDICINE

## 2022-09-24 PROCEDURE — 87635 SARS-COV-2 COVID-19 AMP PRB: CPT

## 2022-09-24 PROCEDURE — 87040 BLOOD CULTURE FOR BACTERIA: CPT

## 2022-09-24 PROCEDURE — 6360000002 HC RX W HCPCS: Performed by: INTERNAL MEDICINE

## 2022-09-24 PROCEDURE — 2580000003 HC RX 258: Performed by: INTERNAL MEDICINE

## 2022-09-24 PROCEDURE — 2580000003 HC RX 258: Performed by: FAMILY MEDICINE

## 2022-09-24 PROCEDURE — 80048 BASIC METABOLIC PNL TOTAL CA: CPT

## 2022-09-24 PROCEDURE — 83735 ASSAY OF MAGNESIUM: CPT

## 2022-09-24 PROCEDURE — 6370000000 HC RX 637 (ALT 250 FOR IP): Performed by: INTERNAL MEDICINE

## 2022-09-24 RX ORDER — MORPHINE SULFATE 2 MG/ML
2 INJECTION, SOLUTION INTRAMUSCULAR; INTRAVENOUS
Status: DISCONTINUED | OUTPATIENT
Start: 2022-09-24 | End: 2022-09-26

## 2022-09-24 RX ORDER — SACCHAROMYCES BOULARDII 250 MG
250 CAPSULE ORAL 2 TIMES DAILY
Status: DISCONTINUED | OUTPATIENT
Start: 2022-09-24 | End: 2022-09-29 | Stop reason: HOSPADM

## 2022-09-24 RX ADMIN — HYDROMORPHONE HYDROCHLORIDE 1 MG: 1 INJECTION, SOLUTION INTRAMUSCULAR; INTRAVENOUS; SUBCUTANEOUS at 03:26

## 2022-09-24 RX ADMIN — HYDROCHLOROTHIAZIDE 25 MG: 25 TABLET ORAL at 08:53

## 2022-09-24 RX ADMIN — ONDANSETRON 4 MG: 4 TABLET, ORALLY DISINTEGRATING ORAL at 18:37

## 2022-09-24 RX ADMIN — LABETALOL HYDROCHLORIDE 10 MG: 5 INJECTION INTRAVENOUS at 17:09

## 2022-09-24 RX ADMIN — HYDROCODONE BITARTRATE AND ACETAMINOPHEN 1 TABLET: 10; 325 TABLET ORAL at 10:59

## 2022-09-24 RX ADMIN — ANTI-FUNGAL POWDER MICONAZOLE NITRATE TALC FREE: 1.42 POWDER TOPICAL at 21:27

## 2022-09-24 RX ADMIN — PIPERACILLIN AND TAZOBACTAM 4500 MG: 4; .5 INJECTION, POWDER, LYOPHILIZED, FOR SOLUTION INTRAVENOUS at 04:18

## 2022-09-24 RX ADMIN — HYDROMORPHONE HYDROCHLORIDE 1 MG: 1 INJECTION, SOLUTION INTRAMUSCULAR; INTRAVENOUS; SUBCUTANEOUS at 14:45

## 2022-09-24 RX ADMIN — SODIUM CHLORIDE, PRESERVATIVE FREE 10 ML: 5 INJECTION INTRAVENOUS at 21:40

## 2022-09-24 RX ADMIN — AMLODIPINE BESYLATE 10 MG: 10 TABLET ORAL at 08:53

## 2022-09-24 RX ADMIN — POTASSIUM CHLORIDE 20 MEQ: 1500 TABLET, EXTENDED RELEASE ORAL at 11:51

## 2022-09-24 RX ADMIN — ENOXAPARIN SODIUM 60 MG: 100 INJECTION SUBCUTANEOUS at 21:23

## 2022-09-24 RX ADMIN — LABETALOL HYDROCHLORIDE 10 MG: 5 INJECTION INTRAVENOUS at 03:26

## 2022-09-24 RX ADMIN — MORPHINE SULFATE 2 MG: 2 INJECTION, SOLUTION INTRAMUSCULAR; INTRAVENOUS at 16:49

## 2022-09-24 RX ADMIN — POTASSIUM CHLORIDE 20 MEQ: 1500 TABLET, EXTENDED RELEASE ORAL at 08:53

## 2022-09-24 RX ADMIN — PIPERACILLIN AND TAZOBACTAM 4500 MG: 4; .5 INJECTION, POWDER, LYOPHILIZED, FOR SOLUTION INTRAVENOUS at 21:19

## 2022-09-24 RX ADMIN — ACETAMINOPHEN 650 MG: 325 TABLET ORAL at 21:23

## 2022-09-24 RX ADMIN — VANCOMYCIN HYDROCHLORIDE 1250 MG: 10 INJECTION, POWDER, LYOPHILIZED, FOR SOLUTION INTRAVENOUS at 16:16

## 2022-09-24 RX ADMIN — HYDROMORPHONE HYDROCHLORIDE 1 MG: 1 INJECTION, SOLUTION INTRAMUSCULAR; INTRAVENOUS; SUBCUTANEOUS at 08:53

## 2022-09-24 RX ADMIN — POTASSIUM CHLORIDE 20 MEQ: 1500 TABLET, EXTENDED RELEASE ORAL at 16:15

## 2022-09-24 RX ADMIN — ENOXAPARIN SODIUM 60 MG: 100 INJECTION SUBCUTANEOUS at 08:53

## 2022-09-24 RX ADMIN — SPIRONOLACTONE 50 MG: 25 TABLET ORAL at 08:53

## 2022-09-24 RX ADMIN — LOSARTAN POTASSIUM 50 MG: 50 TABLET, FILM COATED ORAL at 08:53

## 2022-09-24 RX ADMIN — PIPERACILLIN AND TAZOBACTAM 4500 MG: 4; .5 INJECTION, POWDER, LYOPHILIZED, FOR SOLUTION INTRAVENOUS at 11:51

## 2022-09-24 RX ADMIN — VANCOMYCIN HYDROCHLORIDE 1250 MG: 10 INJECTION, POWDER, LYOPHILIZED, FOR SOLUTION INTRAVENOUS at 02:00

## 2022-09-24 RX ADMIN — LABETALOL HYDROCHLORIDE 10 MG: 5 INJECTION INTRAVENOUS at 10:59

## 2022-09-24 RX ADMIN — SODIUM CHLORIDE, PRESERVATIVE FREE 10 ML: 5 INJECTION INTRAVENOUS at 08:53

## 2022-09-24 RX ADMIN — Medication 250 MG: at 21:23

## 2022-09-24 ASSESSMENT — PAIN DESCRIPTION - PAIN TYPE
TYPE: ACUTE PAIN

## 2022-09-24 ASSESSMENT — PAIN DESCRIPTION - ORIENTATION
ORIENTATION: MID
ORIENTATION: RIGHT
ORIENTATION: MID
ORIENTATION: MID
ORIENTATION: RIGHT
ORIENTATION: RIGHT
ORIENTATION: RIGHT;MID

## 2022-09-24 ASSESSMENT — PAIN SCALES - GENERAL
PAINLEVEL_OUTOF10: 0
PAINLEVEL_OUTOF10: 10
PAINLEVEL_OUTOF10: 0
PAINLEVEL_OUTOF10: 10
PAINLEVEL_OUTOF10: 0
PAINLEVEL_OUTOF10: 5
PAINLEVEL_OUTOF10: 10
PAINLEVEL_OUTOF10: 3
PAINLEVEL_OUTOF10: 10
PAINLEVEL_OUTOF10: 8
PAINLEVEL_OUTOF10: 6
PAINLEVEL_OUTOF10: 7
PAINLEVEL_OUTOF10: 7
PAINLEVEL_OUTOF10: 6

## 2022-09-24 ASSESSMENT — PAIN - FUNCTIONAL ASSESSMENT
PAIN_FUNCTIONAL_ASSESSMENT: PREVENTS OR INTERFERES WITH MANY ACTIVE NOT PASSIVE ACTIVITIES
PAIN_FUNCTIONAL_ASSESSMENT: PREVENTS OR INTERFERES SOME ACTIVE ACTIVITIES AND ADLS
PAIN_FUNCTIONAL_ASSESSMENT: PREVENTS OR INTERFERES SOME ACTIVE ACTIVITIES AND ADLS
PAIN_FUNCTIONAL_ASSESSMENT: ACTIVITIES ARE NOT PREVENTED
PAIN_FUNCTIONAL_ASSESSMENT: PREVENTS OR INTERFERES SOME ACTIVE ACTIVITIES AND ADLS
PAIN_FUNCTIONAL_ASSESSMENT: PREVENTS OR INTERFERES SOME ACTIVE ACTIVITIES AND ADLS
PAIN_FUNCTIONAL_ASSESSMENT: ACTIVITIES ARE NOT PREVENTED

## 2022-09-24 ASSESSMENT — PAIN DESCRIPTION - LOCATION
LOCATION: HEAD
LOCATION: ARM
LOCATION: ARM;BREAST
LOCATION: ARM;HEAD
LOCATION: ARM
LOCATION: HEAD
LOCATION: HEAD
LOCATION: ARM;HEAD

## 2022-09-24 ASSESSMENT — PAIN DESCRIPTION - DESCRIPTORS
DESCRIPTORS: SHARP
DESCRIPTORS: ACHING;SORE
DESCRIPTORS: SORE
DESCRIPTORS: SHARP

## 2022-09-24 ASSESSMENT — PAIN DESCRIPTION - ONSET
ONSET: ON-GOING
ONSET: ON-GOING

## 2022-09-24 ASSESSMENT — PAIN DESCRIPTION - FREQUENCY: FREQUENCY: CONTINUOUS

## 2022-09-24 NOTE — PROGRESS NOTES
ACUTE PHYSICAL THERAPY GOALS:   (Developed with and agreed upon by patient and/or caregiver. )  LTG:   (1.)Mr. Nabil Thompson will ambulate with STAND BY ASSIST for 100 feet with the least restrictive device within 1 treatment day(s). GOAL MET 9/24/2022  ________________________________________________________________________________________________     PHYSICAL THERAPY Initial Assessment, Discharge, and AM  (Link to Caseload Tracking: PT Visit Days : 1  Acknowledge Orders  Time In/Out  PT Charge Capture  Rehab Caseload Tracker    Erica Rivera is a 37 y.o. male   PRIMARY DIAGNOSIS: Sepsis ([address]  Septicemia ([address] [A41.9]  Abdominal wall cellulitis [N01.104]  Sepsis ([address] [A41.9]       Reason for Referral: Difficulty in walking, Not elsewhere classified (R26.2)  Inpatient: Payor: /     ASSESSMENT:     REHAB RECOMMENDATIONS:   Recommendation to date pending progress:  Setting:  No further skilled therapy after discharge from hospital    Equipment:    None     ASSESSMENT:  Mr. Nabil Thompson presents to hospital on 9/23/22 with fever, tachycardic, recent diagnosis of cellulitis R UE/axilla. Pt independent at baseline, lives with mother, works full time as trunk . Pt on 4 L O2, stats 99% at rest. Pt decreased to 2 L O2, stats 95%. Pt then placed on RA for mobility, performs transfers and ambulation with SBA. Pt with increased trunk sway and wide KRUNAL with ambulation, reports at baseline for mobility. Pt returned to room, O2 on RA, stats 94% after activity. Pt overall appears to be at baseline level of activity, no PT needs at this time, will discharge.       [address]\"" [address] [address] AM-PAC 6 Clicks Basic Mobility Inpatient Short Form  AM-PAC Mobility Inpatient   How much difficulty turning over in bed?: None  How much difficulty sitting down on / standing up from a chair with arms?: None  How much difficulty moving from lying on back to sitting on side of bed?: None  How much help from another person moving to and from a bed to a chair?: None  How much help from another person needed to walk in hospital room?: None  How much help from another person for climbing 3-5 steps with a railing?: A Little  AM-PAC Inpatient Mobility Raw Score : 23  AM-PAC Inpatient T-Scale Score : 56.93  Mobility Inpatient CMS 0-100% Score: 11.2  Mobility Inpatient CMS G-Code Modifier : CI    SUBJECTIVE:   Mr. Onesimo Lopez states, \"I am doing ok, just have this pain in my R side\"     Social/Functional Lives With: Family  Type of Home: House  Home Layout: One level  ADL Assistance: Independent  Homemaking Assistance: Independent  Ambulation Assistance: Independent  Transfer Assistance: Independent  Active : Yes  Mode of Transportation: Car  Occupation: Full time employment    OBJECTIVE:     PAIN: Danny Serafin / O2: Meseret Wheatley / Pranay Castelan / Graciela Papa:   Pre Treatment: 10/10 just had pain medication         Post Treatment: 8/10 Vitals        Oxygen  O2 Therapy: Oxygen  O2 Flow Rate (L/min): 4 L/min (worked down to [employer], stats 94% after ambulation)  SpO2: 99 %   None    RESTRICTIONS/PRECAUTIONS:  Restrictions/Precautions: None                 GROSS EVALUATION: Intact Impaired (Comments):   AROM [x]  Limited by adipose tissue   PROM [x]    Strength [x]     Balance [] Standing - Static: Fair, +  Standing - Dynamic: Fair, +   Posture [] N/A   Sensation [x]     Coordination [x]      Tone [x]     Edema [x]    Activity Tolerance []  General fatigue due to size    []      COGNITION/  PERCEPTION: Intact Impaired (Comments):   Orientation [x]     Vision [x]     Hearing [x]     Cognition  [x]       MOBILITY: I Mod I S SBA CGA Min Mod Max Total  NT x2 Comments:   Bed Mobility    Rolling [] [] [] [] [] [] [] [] [] [x] [] In chair   Supine to Sit [] [] [] [] [] [] [] [] [] [x] []    Scooting [] [] [] [] [] [] [] [] [] [x] []    Sit to Supine [] [] [] [] [] [] [] [] [] [x] []    Transfers    Sit to Stand [] [] [] [x] [] [] [] [] [] [] []    Bed to Chair [] [] [] [] [] [] [] [] [] [x] [] Stand to Sit [] [] [] [x] [] [] [] [] [] [] []     [] [] [] [] [] [] [] [] [] [] []    I=Independent, Mod I=Modified Independent, S=Supervision, SBA=Standby Assistance, CGA=Contact Guard Assistance,   Min=Minimal Assistance, Mod=Moderate Assistance, Max=Maximal Assistance, Total=Total Assistance, NT=Not Tested    GAIT: I Mod I S SBA CGA Min Mod Max Total  NT x2 Comments:   Level of Assistance [] [] [] [x] [] [] [] [] [] [] []    Distance 100  feet    DME None    Gait Quality Trunk sway increased and Wide base of support    Weightbearing Status      Stairs      I=Independent, Mod I=Modified Independent, S=Supervision, SBA=Standby Assistance, CGA=Contact Guard Assistance,   Min=Minimal Assistance, Mod=Moderate Assistance, Max=Maximal Assistance, Total=Total Assistance, NT=Not Tested    PLAN:   FREQUENCY AND DURATION:  (eval and d/c) for duration of hospital stay or until stated goals are met, whichever comes first.    THERAPY PROGNOSIS: Good    PROBLEM LIST:   (Skilled intervention is medically necessary to address:)  Decreased Activity Tolerance INTERVENTIONS PLANNED:   (Benefits and precautions of physical therapy have been discussed with the patient.)  Therapeutic Activity  Education       TREATMENT:   EVALUATION: LOW COMPLEXITY: (Untimed Charge)    TREATMENT:   Co-Treatment PT/OT necessary due to patient's decreased overall endurance/tolerance levels, as well as need for high level skilled assistance to complete functional transfers/mobility and functional tasks  Therapeutic Activity (8 Minutes): Therapeutic activity included Scooting, Transfer Training, Ambulation on level ground, Sitting balance , Standing balance, and breathing technique to improve functional Activity tolerance, Balance, Coordination, Mobility, and Strength.     TREATMENT GRID:  N/A    AFTER TREATMENT PRECAUTIONS: Bed/Chair Locked, Call light within reach, Chair, Needs within reach, RN notified, and Visitors at bedside    INTERDISCIPLINARY COLLABORATION:  RN/ PCT, PT/ PTA, and OT/ RANDOLPH    EDUCATION: Education Given To: Patient  Education Provided: Transfer Training;Energy Conservation  Education Method: Verbal  Barriers to Learning: None  Education Outcome: Verbalized understanding    TIME IN/OUT:  Time In: 1127  Time Out: [facility]  Minutes: [address] [place] INEZ Davis

## 2022-09-24 NOTE — PROGRESS NOTES
VANCO DAILY FOLLOW UP NOTE  [address] [facility] Pharmacokinetic Monitoring Service - Vancomycin    Consulting Provider: Guanaco Vallejo   Indication: SSTI  Target Concentration: Goal trough of 10-15 mg/L and AUC/CARLO <500 mg*hr/L  Day of Therapy: 2 of 5  Additional Antimicrobials: none    Pertinent Laboratory Values: Wt Readings from Last 1 Encounters:   09/22/22 (!) 480 lb (217.7 kg)     Temp Readings from Last 1 Encounters:   09/24/22 99 °F (37.2 °C)     Recent Labs     09/22/22  1930 09/23/22  0716 09/24/22  0400 09/24/22  0832   BUN 17 11 11  --    CREATININE 1.40 1.20 1.20  --    WBC 19.5* 24.5* 21.3*  --    PROCAL 0.28  --   --   --    LACACIDPL 0.9  --   --  1.0     Estimated Creatinine Clearance: 145 mL/min (based on SCr of 1.2 mg/dL). Lab Results   Component Value Date/Time    VANCORANDOM 9.3 09/24/2022 08:32 AM       MRSA Nasal Swab: N/A. Non-respiratory infection.       Assessment:  Date/Time Dose Concentration AUC   9/24 0832 1250 mg q12h 9.3 276   Note: Serum concentrations collected for AUC dosing may appear elevated if collected in close proximity to the dose administered, this is not necessarily an indication of toxicity    Plan:  Current dosing regimen is sub-therapeutic  Increase dose to 1250 mg q8h for predicted AUC/Tr of 414/7.6  Repeat vancomycin concentrations will be ordered as clinically appropriate   Pharmacy will continue to monitor patient and adjust therapy as indicated    Thank you for the consult,  TYREL Jade KATHY [facility]

## 2022-09-24 NOTE — PLAN OF CARE
Problem: Discharge Planning  Goal: Discharge to home or other facility with appropriate resources  Outcome: Progressing  Flowsheets (Taken 9/23/2022 2045)  Discharge to home or other facility with appropriate resources:   Identify barriers to discharge with patient and caregiver   Arrange for needed discharge resources and transportation as appropriate   Identify discharge learning needs (meds, wound care, etc)   Refer to discharge planning if patient needs post-hospital services based on physician order or complex needs related to functional status, cognitive ability or social support system     Problem: Pain  Goal: Verbalizes/displays adequate comfort level or baseline comfort level  Outcome: Progressing  Flowsheets (Taken 9/24/2022 0822)  Verbalizes/displays adequate comfort level or baseline comfort level:   Encourage patient to monitor pain and request assistance   Assess pain using appropriate pain scale   Administer analgesics based on type and severity of pain and evaluate response   Implement non-pharmacological measures as appropriate and evaluate response   Notify Licensed Independent Practitioner if interventions unsuccessful or patient reports new pain     Problem: Safety - Adult  Goal: Free from fall injury  Outcome: Progressing  Flowsheets (Taken 9/24/2022 0822)  Free From Fall Injury: Instruct family/caregiver on patient safety     Problem: ABCDS Injury Assessment  Goal: Absence of physical injury  Outcome: Progressing  Flowsheets (Taken 9/24/2022 9835)  Absence of Physical Injury: Implement safety measures based on patient assessment

## 2022-09-24 NOTE — PLAN OF CARE
Problem: Discharge Planning  Goal: Discharge to home or other facility with appropriate resources  9/24/2022 1346 by Ted Mayes RN  Outcome: Progressing  9/24/2022 0822 by Melanie Chowdary RN  Outcome: Progressing  Flowsheets (Taken 9/23/2022 2045)  Discharge to home or other facility with appropriate resources:   Identify barriers to discharge with patient and caregiver   Arrange for needed discharge resources and transportation as appropriate   Identify discharge learning needs (meds, wound care, etc)   Refer to discharge planning if patient needs post-hospital services based on physician order or complex needs related to functional status, cognitive ability or social support system     Problem: Pain  Goal: Verbalizes/displays adequate comfort level or baseline comfort level  9/24/2022 1346 by Ted Mayes RN  Outcome: Progressing  9/24/2022 0822 by Melanie Chowdary RN  Outcome: Progressing  Flowsheets (Taken 9/24/2022 4600)  Verbalizes/displays adequate comfort level or baseline comfort level:   Encourage patient to monitor pain and request assistance   Assess pain using appropriate pain scale   Administer analgesics based on type and severity of pain and evaluate response   Implement non-pharmacological measures as appropriate and evaluate response   Notify Licensed Independent Practitioner if interventions unsuccessful or patient reports new pain     Problem: Safety - Adult  Goal: Free from fall injury  9/24/2022 1346 by Ted Mayes RN  Outcome: Progressing  9/24/2022 0822 by Melanie Chowdary RN  Outcome: Progressing  Flowsheets (Taken 9/24/2022 0822)  Free From Fall Injury: Instruct family/caregiver on patient safety     Problem: ABCDS Injury Assessment  Goal: Absence of physical injury  9/24/2022 1346 by Ted Mayes RN  Outcome: Progressing  9/24/2022 0822 by Melanie Chowdary RN  Outcome: Progressing  Flowsheets (Taken 9/24/2022 1304)  Absence of Physical Injury: Implement safety measures based on patient assessment

## 2022-09-24 NOTE — PROGRESS NOTES
chart. Patient reported pain over cellulitic area. Was reported to be on 4 LO 2 NC but was found with nasal cannula hanging on pole at bedside and patient on room air with no acute distress. He reported that he has had a sleep study in the past but was never called about the result and has lost to follow-up since then. Has never worn CPAP/BiPAP in the past.    No fever, chills, SOB, chest pain, abdominal pain, nausea, vomiting    Assessment & Plan:     Sepsis   Cellulitis of right axilla  Met criteria with T102.2, HR>100 with source. Failed outpatient therapy with Keflex and Bactrim. CT chest abdomen and pelvis shows changes suggestive of cellulitis in the R axilla and adjacent chest wall; no acute intra-abdominal or intrathoracic process  No BCX obtained on admission  Abx: Vanc/Zosyn (9/23-. ..)  Obtain BCX though pt already on abx so might affect result  Check MRSA  Consult wound care  Large area of induration, will consult general surgery in AM to eval for possible I&D    Acute respiratory failure with hypoxia  Morbid obesity with BMI of 70  SAROJ/OHS  O2 sat 85% on RA and placed on 4LO2NC. CT chest on 9/23 was unremarkable overall  Sleep studies in 10/2021 appeared to be consistent with severe SAROJ/OHS. Lost to follow up. Per Sofia Dr. Winsome Kapadia:  \"Severe obstructive sleep apnea with an AHI of  67.8 events/hr, arousal index   of 0 per hour and a low saturation of 50%. Obstructive sleep apnea best treated on Bipap at 24 / 20 cmH2O, with a   large-sized, F-20, AIr-Fit, Resmed, Full Face Mask. There were no PLMS with an index of  0.0/hr and PLMS arousal index of 0 /hr. CPAP was not sufficient in treating the patient's sleep disordered breathing. \"  Will discuss with CM about home BiPAP to arrange for patient. ? If needing pulm consult. Check rapid Covid-19  On 4 LO 2 NC, wean as tolerated   RT for walking pulse ox prior to discharge.      Hypokalemia  Replace and recheck  Check Mag  If worsens, will d/c Hctz    Essential hypertension  Continue home Norvasc, hydrochlorothiazide, losartan, spironolactone        Anticipated discharge needs:      1-2 days pending improvement. PT/OT--no further skilled therapy needed    I spent 37 minutes of time caring for this patient on the unit nearby or at bedside, and more than 50 percent was spent on coordination of care activities, and/or patient/family counseling regarding status and plan of care. Diet:  ADULT DIET; Regular  DVT PPx: Lovenox SQ  Code status: Full Code    Hospital Problems:  Principal Problem:    Sepsis ([facility] [address]  Active Problems:    Depression    Essential hypertension    Morbid obesity with BMI of 70 and over, adult ([facility] [address]    Cellulitis of right axilla  Resolved Problems:    * No resolved hospital problems. *      Objective:   Patient Vitals for the past 24 hrs:   Temp Pulse Resp BP SpO2   09/24/22 1453 -- -- -- (!) 166/97 --   09/24/22 1445 -- -- 18 -- --   09/24/22 1258 98.8 °F (37.1 °C) 91 19 (!) 145/83 92 %   09/24/22 1127 -- -- -- -- 99 %   09/24/22 1059 -- -- 18 -- --   09/24/22 1056 -- -- -- (!) 163/100 --   09/24/22 0800 99 °F (37.2 °C) 95 25 (!) 165/92 95 %   09/24/22 0435 99.1 °F (37.3 °C) 93 22 (!) 167/93 92 %   09/24/22 0003 98.8 °F (37.1 °C) 98 22 139/72 95 %   09/23/22 2048 (!) 101.3 °F (38.5 °C) (!) 101 22 (!) 152/83 96 %   09/23/22 1819 -- -- -- -- 95 %   09/23/22 1635 -- -- -- -- (!) 85 %   09/23/22 1540 98 °F (36.7 °C) (!) 107 22 (!) 175/91 92 %       Oxygen Therapy  SpO2: 92 %  Pulse Oximetry Type: Intermittent  O2 Device: None (Room air)  O2 Flow Rate (L/min): 4 L/min (worked down to [employer], stats 94% after ambulation)    Estimated body mass index is 77.47 kg/m² as calculated from the following:    Height as of this encounter: 5' 6\" (1.676 m). Weight as of this encounter: 480 lb (217.7 kg).     Intake/Output Summary (Last 24 hours) at 9/24/2022 1524  Last data filed at 9/24/2022 1225  Gross per 24 hour   Intake 600 ml   Output 550 ml   Net 50 ml         Physical Exam:     Blood pressure (!) 166/97, pulse 91, temperature 98.8 °F (37.1 °C), temperature source Oral, resp. rate 18, height 5' 6\" (1.676 m), weight (!) 480 lb (217.7 kg), SpO2 92 %. General:    Well nourished. Morbidly obese  Head:  Normocephalic, atraumatic  Eyes:  Sclerae appear normal.  Pupils equally round. ENT:  Nares appear normal, no drainage. Moist oral mucosa  Neck:  No restricted ROM. Trachea midline   CV:   RRR. No m/r/g. No jugular venous distension. Lungs:   CTAB. No wheezing, rhonchi, or rales. Symmetric expansion. Abdomen: Bowel sounds present. Soft, nontender, nondistended. Extremities: No cyanosis or clubbing. No edema  Skin:     R axilla extending to under R breast/ skin fold on RUQ with erythema and weeping induration, TTP. Neuro:  CN II-XII grossly intact. Sensation intact. A&Ox3  Psych:  Normal mood and affect.       I have personally reviewed labs and tests showing:  Recent Labs:  Recent Results (from the past 48 hour(s))   Culture, Blood 1    Collection Time: 09/22/22  7:30 PM    Specimen: Blood   Result Value Ref Range    Special Requests RIGHT  Antecubital        Culture NO GROWTH 2 DAYS     Lactic Acid    Collection Time: 09/22/22  7:30 PM   Result Value Ref Range    Lactic Acid, Plasma 0.9 0.4 - 2.0 MMOL/L   CBC with Auto Differential    Collection Time: 09/22/22  7:30 PM   Result Value Ref Range    WBC 19.5 (H) 4.3 - 11.1 K/uL    RBC 4.57 4.23 - 5.6 M/uL    Hemoglobin 13.8 13.6 - 17.2 g/dL    Hematocrit 42.0 41.1 - 50.3 %    MCV 91.9 79.6 - 97.8 FL    MCH 30.2 26.1 - 32.9 PG    MCHC 32.9 31.4 - 35.0 g/dL    RDW 13.9 11.9 - 14.6 %    Platelets 109 626 - 621 K/uL    MPV 9.7 9.4 - 12.3 FL    nRBC 0.00 0.0 - 0.2 K/uL    Differential Type AUTOMATED      Seg Neutrophils 82 (H) 43 - 78 %    Lymphocytes 7 (L) 13 - 44 %    Monocytes 9 4.0 - 12.0 %    Eosinophils % 1 0.5 - 7.8 %    Basophils 0 0.0 - 2.0 %    Immature Granulocytes 1 0.0 - 5.0 %    Segs Absolute 16.1 (H) 1.7 - 8.2 K/UL    Absolute Lymph # 1.4 0.5 - 4.6 K/UL    Absolute Mono # 1.7 (H) 0.1 - 1.3 K/UL    Absolute Eos # 0.2 0.0 - 0.8 K/UL    Basophils Absolute 0.0 0.0 - 0.2 K/UL    Absolute Immature Granulocyte 0.2 0.0 - 0.5 K/UL   CMP    Collection Time: 09/22/22  7:30 PM   Result Value Ref Range    Sodium 135 (L) 136 - 145 mmol/L    Potassium 3.2 (L) 3.5 - 5.1 mmol/L    Chloride 98 (L) 101 - 110 mmol/L    CO2 33 (H) 21 - 32 mmol/L    Anion Gap 4 4 - 13 mmol/L    Glucose 102 (H) 65 - 100 mg/dL    BUN 17 6 - 23 MG/DL    Creatinine 1.40 0.8 - 1.5 MG/DL    GFR African American >60 >60 ml/min/1.73m2    GFR Non- 60 (L) >60 ml/min/1.73m2    Calcium 8.9 8.3 - 10.4 MG/DL    Total Bilirubin 1.8 (H) 0.2 - 1.1 MG/DL    ALT 10 (L) 12 - 65 U/L    AST 9 (L) 15 - 37 U/L    Alk Phosphatase 78 50 - 136 U/L    Total Protein 8.5 (H) 6.3 - 8.2 g/dL    Albumin 2.9 (L) 3.5 - 5.0 g/dL    Globulin 5.6 (H) 2.3 - 3.5 g/dL    Albumin/Globulin Ratio 0.5 (L) 1.2 - 3.5     Procalcitonin    Collection Time: 09/22/22  7:30 PM   Result Value Ref Range    Procalcitonin 0.28 0.00 - 0.49 ng/mL   EKG 12 Lead    Collection Time: 09/22/22  7:30 PM   Result Value Ref Range    Ventricular Rate 99 BPM    Atrial Rate 99 BPM    P-R Interval 148 ms    QRS Duration 92 ms    Q-T Interval 378 ms    QTc Calculation (Bazett) 485 ms    P Axis 55 degrees    R Axis 96 degrees    T Axis 10 degrees    Diagnosis Sinus rhythm with Premature atrial complexes    CBC with Auto Differential    Collection Time: 09/23/22  7:16 AM   Result Value Ref Range    WBC 24.5 (H) 4.3 - 11.1 K/uL    RBC 5.05 4.23 - 5.6 M/uL    Hemoglobin 15.0 13.6 - 17.2 g/dL    Hematocrit 45.8 41.1 - 50.3 %    MCV 90.7 79.6 - 97.8 FL    MCH 29.7 26.1 - 32.9 PG    MCHC 32.8 31.4 - 35.0 g/dL    RDW 14.0 11.9 - 14.6 %    Platelets 137 422 - 478 K/uL    MPV 10.2 9.4 - 12.3 FL    nRBC 0.00 0.0 - 0.2 K/uL    Seg Neutrophils 85 (H) 43 - 78 %    Lymphocytes 4 (L) 13 - 44 % Monocytes 9 4.0 - 12.0 %    Eosinophils % 0 (L) 0.5 - 7.8 %    Basophils 0 0.0 - 2.0 %    Immature Granulocytes 2 0.0 - 5.0 %    Segs Absolute 20.8 (H) 1.7 - 8.2 K/UL    Absolute Lymph # 1.0 0.5 - 4.6 K/UL    Absolute Mono # 2.2 (H) 0.1 - 1.3 K/UL    Absolute Eos # 0.0 0.0 - 0.8 K/UL    Basophils Absolute 0.0 0.0 - 0.2 K/UL    Absolute Immature Granulocyte 0.5 0.0 - 0.5 K/UL    RBC Comment SLIGHT  ANISOCYTOSIS + POIKILOCYTOSIS        WBC Comment Result Confirmed By Smear      Platelet Comment ADEQUATE      Differential Type AUTOMATED     Basic Metabolic Panel w/ Reflex to MG    Collection Time: 09/23/22  7:16 AM   Result Value Ref Range    Sodium 136 (L) 138 - 145 mmol/L    Potassium 3.8 3.5 - 5.1 mmol/L    Chloride 101 101 - 110 mmol/L    CO2 27 21 - 32 mmol/L    Anion Gap 8 4 - 13 mmol/L    Glucose 135 (H) 65 - 100 mg/dL    BUN 11 6 - 23 MG/DL    Creatinine 1.20 0.8 - 1.5 MG/DL    GFR African American >60 >60 ml/min/1.73m2    GFR Non- >60 >60 ml/min/1.73m2    Calcium 8.9 8.3 - 10.4 MG/DL   PLEASE READ & DOCUMENT PPD TEST IN 24 HRS    Collection Time: 09/24/22 12:00 AM   Result Value Ref Range    PPD, (POC) Negative Negative    mm Induration 0 0 - 5 mm   Basic Metabolic Panel w/ Reflex to MG    Collection Time: 09/24/22  4:00 AM   Result Value Ref Range    Sodium 134 (L) 138 - 145 mmol/L    Potassium 3.4 (L) 3.5 - 5.1 mmol/L    Chloride 98 (L) 101 - 110 mmol/L    CO2 34 (H) 21 - 32 mmol/L    Anion Gap 2 (L) 4 - 13 mmol/L    Glucose 128 (H) 65 - 100 mg/dL    BUN 11 6 - 23 MG/DL    Creatinine 1.20 0.8 - 1.5 MG/DL    GFR African American >60 >60 ml/min/1.73m2    GFR Non- >60 >60 ml/min/1.73m2    Calcium 8.8 8.3 - 10.4 MG/DL   CBC with Auto Differential    Collection Time: 09/24/22  4:00 AM   Result Value Ref Range    WBC 21.3 (H) 4.3 - 11.1 K/uL    RBC 4.12 (L) 4.23 - 5.6 M/uL    Hemoglobin 12.2 (L) 13.6 - 17.2 g/dL    Hematocrit 38.3 (L) 41.1 - 50.3 %    MCV 93.0 79.6 - 97.8 FL    MCH 29.6 26.1 - 32.9 PG    MCHC 31.9 31.4 - 35.0 g/dL    RDW 14.0 11.9 - 14.6 %    Platelets 945 174 - 265 K/uL    MPV 9.7 9.4 - 12.3 FL    nRBC 0.00 0.0 - 0.2 K/uL    Differential Type AUTOMATED      Seg Neutrophils 81 (H) 43 - 78 %    Lymphocytes 6 (L) 13 - 44 %    Monocytes 12 4.0 - 12.0 %    Eosinophils % 1 0.5 - 7.8 %    Basophils 0 0.0 - 2.0 %    Immature Granulocytes 1 0.0 - 5.0 %    Segs Absolute 17.2 (H) 1.7 - 8.2 K/UL    Absolute Lymph # 1.3 0.5 - 4.6 K/UL    Absolute Mono # 2.5 (H) 0.1 - 1.3 K/UL    Absolute Eos # 0.1 0.0 - 0.8 K/UL    Basophils Absolute 0.0 0.0 - 0.2 K/UL    Absolute Immature Granulocyte 0.2 0.0 - 0.5 K/UL   Magnesium    Collection Time: 09/24/22  4:00 AM   Result Value Ref Range    Magnesium 1.8 1.8 - 2.4 mg/dL   Vancomycin Level, Random    Collection Time: 09/24/22  8:32 AM   Result Value Ref Range    Vancomycin Rm 9.3 UG/ML   Lactic Acid    Collection Time: 09/24/22  8:32 AM   Result Value Ref Range    Lactic Acid, Plasma 1.0 0.4 - 2.0 MMOL/L       I have personally reviewed imaging studies showing: Other Studies:  CT CHEST ABDOMEN PELVIS W CONTRAST   Final Result   1. Changes suggestive of cellulitis in the right axilla and adjacent chest wall. 2. No acute intra-abdominal or intrathoracic process. XR CHEST PORTABLE   Final Result   No acute process.           Current Meds:  Current Facility-Administered Medications   Medication Dose Route Frequency    vancomycin (VANCOCIN) 1250 mg in sodium chloride 0.9% 250 mL IVPB  1,250 mg IntraVENous Q8H    losartan (COZAAR) tablet 50 mg  50 mg Oral Daily    amLODIPine (NORVASC) tablet 10 mg  10 mg Oral Daily    hydroCHLOROthiazide (HYDRODIURIL) tablet 25 mg  25 mg Oral Daily    sodium chloride flush 0.9 % injection 5-40 mL  5-40 mL IntraVENous 2 times per day    sodium chloride flush 0.9 % injection 5-40 mL  5-40 mL IntraVENous PRN    0.9 % sodium chloride infusion   IntraVENous PRN    enoxaparin (LOVENOX) injection 60 mg  60 mg SubCUTAneous BID    ondansetron (ZOFRAN-ODT) disintegrating tablet 4 mg  4 mg Oral Q8H PRN    Or    ondansetron (ZOFRAN) injection 4 mg  4 mg IntraVENous Q6H PRN    polyethylene glycol (GLYCOLAX) packet 17 g  17 g Oral Daily PRN    acetaminophen (TYLENOL) tablet 650 mg  650 mg Oral Q6H PRN    Or    acetaminophen (TYLENOL) suppository 650 mg  650 mg Rectal Q6H PRN    potassium chloride (KLOR-CON M) extended release tablet 20 mEq  20 mEq Oral TID WC    HYDROcodone-acetaminophen (NORCO)  MG per tablet 1 tablet  1 tablet Oral Q6H PRN    spironolactone (ALDACTONE) tablet 50 mg  50 mg Oral Daily    HYDROmorphone HCl PF (DILAUDID) injection 1 mg  1 mg IntraVENous Q4H PRN    piperacillin-tazobactam (ZOSYN) 4,500 mg in sodium chloride 0.9 % 100 mL IVPB (mini-bag)  4,500 mg IntraVENous Q8H    labetalol (NORMODYNE;TRANDATE) injection 10 mg  10 mg IntraVENous Q6H PRN       Signed: Jose García DO    Part of this note may have been written by using a voice dictation software. The note has been proof read but may still contain some grammatical/other typographical errors.

## 2022-09-24 NOTE — PROGRESS NOTES
ACUTE OCCUPATIONAL THERAPY GOALS:   (Developed with and agreed upon by patient and/or caregiver.)  1) Patient will complete lower body dressing with mod I and adaptive equipment as needed. GOAL MET 9/24/2022  2) Patient will complete toileting with mod I. GOAL MET 9/24/2022  3) Patient will complete functional transfers with mod I and adaptive equipment as needed. GOAL MET 9/24/2022  4) Patient will tolerate at least 8 minutes of OT activity with as needed rest breaks while maintaining O2 sats >90%. GOAL MET 9/24/2022  5) Patient will verbalize at least 3 energy conservation technique to utilize during ADL/IADL. GOAL MET 9/24/2022  Timeframe: 1 visit       OCCUPATIONAL THERAPY Initial Assessment, Daily Note, Discharge, and AM       OT Visit Days: 1  Acknowledge Orders  Time  OT Charge Capture  Rehab Caseload Tracker      Erica Farooq is a 37 y.o. male   PRIMARY DIAGNOSIS: Sepsis ([address]  Septicemia ([address] [A41.9]  Abdominal wall cellulitis [O28.266]  Sepsis ([address] [A41.9]       Reason for Referral: Generalized Muscle Weakness (M62.81)  Other lack of cordination (R27.8)  Difficulty in walking, Not elsewhere classified (R26.2)  Other abnormalities of gait and mobility (R26.89)  Inpatient: Payor: /     ASSESSMENT:     REHAB RECOMMENDATIONS:   Recommendation to date pending progress:  Setting:  No further skilled therapy after discharge from hospital    Equipment:    None     ASSESSMENT:  Mr. John Mrecado is a 38 YO R dominant AAM admitted from home with R UE cellulitis and pain and oxygen levels in low 80s. Pt lives in 1 level home with his mom and adult sister and her children with 6 steps at the entrance with B HRs, has T/S. 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 Today, pt found sitting up in recliner with sister at bedside.  In good spirits, but reports R UE painful with all movements but AROM is WFLs, edema to R UE. Using B hands as needed for [facility] tasks. Able to rosibel socks and reports has been getting up to the bathroom with A for IV pole, but no other DME and managing fine with toileting tasks. Pt ambulated into hallway with lateral trunk sway with wide base of support but no LOB or DME needed, therapist managing IV pole. Pt back up to recliner and left with all needs met and in reach. Educated on how to elevate R UE on pillow to help ease pain and limit edema in R UE with verbal understanding. Pt is functioning near his baseline and not need skilled OT at this time. Pt and family in agreement. DC our services. [employer] AM-PAC 6 Clicks Daily Activity Inpatient Short Form:    AM-PAC Daily Activity Inpatient   How much help for putting on and taking off regular lower body clothing?: None  How much help for Bathing?: None  How much help for Toileting?: None  How much help for putting on and taking off regular upper body clothing?: None  How much help for taking care of personal grooming?: None  How much help for eating meals?: None  AM-[facility] Inpatient Daily Activity Raw Score: 24  AM-PAC Inpatient ADL T-Scale Score : 57.54  ADL Inpatient CMS 0-100% Score: 0  ADL Inpatient CMS G-Code Modifier : CH           SUBJECTIVE:     Mr. Manolo Hernandez states, \"I really feel OK. \"     Social/Functional Lives With: Family  Type of Home: House  Home Layout: One level  ADL Assistance: Independent  Homemaking Assistance: Independent  Ambulation Assistance: Independent  Transfer Assistance: Independent  Active : Yes  Mode of Transportation: Car  Occupation: Full time employment    OBJECTIVE:     RONNI / Jose Chambers / AIRWAY: IV    RESTRICTIONS/PRECAUTIONS:  Restrictions/Precautions: None    PAIN: VITALS / O2:   Pre Treatment:   Pain Assessment: 0-10  Pain Level: 10  Patient's Stated Pain Goal: 0 - No pain  Pain Location: Arm, Breast  Pain Orientation: Right, Mid  Pain Descriptors: Aching, Sore  Functional Pain Assessment: Prevents or interferes some active activities and ADLs  Pain Type: Acute pain  Pain Onset: On-going  Non-Pharmaceutical Pain Intervention(s): Ambulation/Increased Activity, Distraction, Emotional support, Environmental changes, Exercise, Family support, Nurse notified (comment), Repositioned, Rest, Therapeutic presence  Response to Pain Intervention: Pain improved but above pain goal  Side Effects: No reported side effects      Post Treatment: unchanged       Vitals stable          Oxygen  on 4L NC, sats 94%, 2L NC sats 92%, room air 91% after activity, left on room air, RN aware            GROSS EVALUATION: INTACT IMPAIRED   (See Comments)   UE AROM [x] []But pain R UE with above head   UE PROM [] []   Strength [x]  R UE weaker than L, due to pain     Posture / Balance [] Posture: Good  Sitting - Static: Good  Sitting - Dynamic: Fair, +  Standing - Static: Fair  Standing - Dynamic: Fair, rounded shoulders, forward head   Sensation [x]     Coordination [x]       Tone [x]       Edema [x]    Activity Tolerance [] Patient limited by pain, Patient limited by fatigue     Hand Dominance R [x] L [] Using L hand more since painful     COGNITION/  PERCEPTION: INTACT IMPAIRED   (See Comments)   Orientation [x]     Vision [x]     Hearing [x]     Cognition  [x]     Perception [x]       MOBILITY: I Mod I S SBA CGA Min Mod Max Total  NT x2 Comments:   Bed Mobility    Rolling [] [] [] [] [] [] [] [] [] [x] []    Supine to Sit [] [] [] [] [] [] [] [] [] [x] []    Scooting [] [] [] [] [x] [] [] [] [] [] []    Sit to Supine [] [] [] [] [] [] [] [] [] [x] []    Transfers    Sit to Stand [] [] [] [] [x] [] [] [] [] [] []    Bed to Chair [] [] [] [] [x] [] [] [] [] [] []    Stand to Sit [] [] [] [] [x] [] [] [] [] [] []    Tub/Shower [] [] [] [] [] [] [] [] [] [x] []     Toilet [] [] [] [] [x] [] [] [] [] [] []      [] [] [] [] [] [] [] [] [] [] []    I=Independent, Mod I=Modified Independent, S=Supervision/Setup, SBA=Standby grooming ADLs in supported sitting, unsupported sitting, and standing with minimal verbal cueing to increase activity tolerance and increase safety awareness. Patient also participated in functional mobility, functional transfer, energy conservation, and adaptive equipment training to increase activity tolerance and increase safety awareness. TREATMENT GRID:  N/A    AFTER TREATMENT PRECAUTIONS: Bed/Chair Locked, Call light within reach, Chair, Needs within reach, RN notified, and Visitors at bedside    INTERDISCIPLINARY COLLABORATION:  RN/ PCT, PT/ PTA, and OT/ RANDOLPH    EDUCATION:  Education Given To: Patient; Family;Staff  Education Provided: Role of Therapy;Plan of Care;Precautions; ADL Adaptive Strategies;Transfer Training;Energy Conservation;IADL Safety; Family Education;Equipment; Fall Prevention Strategies  Education Provided Comments: use R UE as normally as possible and position on pillow to help wtih edema  Education Method: Demonstration;Verbal;Teach Back  Barriers to Learning: None  Education Outcome: Verbalized understanding;Demonstrated understanding    TOTAL TREATMENT DURATION AND TIME:  Time In: 1133  Time Out: 17[address] [place]  Minutes: 13    SARAH GUTIERREZ, JOSE Gutierrez, MS, OTR/L

## 2022-09-24 NOTE — PROGRESS NOTES
Late skin note from Friday morning 09/23/2022; pt arrived at shift change. Patient's skin is overall dry and flaky. Noted dryness over entire body. Patient's sacrum is intact. Lower extremities have noted 2+ pitting edema. Patient has skin tags on bilateral lower extremities. Heels and sacrum are intact, dry. Heels are flaky. Reviewed skin with Migel DYSON.

## 2022-09-25 ENCOUNTER — ANESTHESIA (OUTPATIENT)
Dept: SURGERY | Age: 42
DRG: 871 | End: 2022-09-25

## 2022-09-25 ENCOUNTER — ANESTHESIA EVENT (OUTPATIENT)
Dept: SURGERY | Age: 42
DRG: 871 | End: 2022-09-25

## 2022-09-25 LAB
ANION GAP SERPL CALC-SCNC: 3 MMOL/L (ref 4–13)
BASOPHILS # BLD: 0.1 K/UL (ref 0–0.2)
BASOPHILS NFR BLD: 0 % (ref 0–2)
BUN SERPL-MCNC: 12 MG/DL (ref 6–23)
CALCIUM SERPL-MCNC: 9.3 MG/DL (ref 8.3–10.4)
CHLORIDE SERPL-SCNC: 96 MMOL/L (ref 101–110)
CO2 SERPL-SCNC: 36 MMOL/L (ref 21–32)
CREAT SERPL-MCNC: 1.3 MG/DL (ref 0.8–1.5)
DIFFERENTIAL METHOD BLD: ABNORMAL
EOSINOPHIL # BLD: 0.3 K/UL (ref 0–0.8)
EOSINOPHIL NFR BLD: 2 % (ref 0.5–7.8)
ERYTHROCYTE [DISTWIDTH] IN BLOOD BY AUTOMATED COUNT: 13.7 % (ref 11.9–14.6)
GLUCOSE SERPL-MCNC: 110 MG/DL (ref 65–100)
HCT VFR BLD AUTO: 38.5 % (ref 41.1–50.3)
HGB BLD-MCNC: 12.2 G/DL (ref 13.6–17.2)
IMM GRANULOCYTES # BLD AUTO: 0.1 K/UL (ref 0–0.5)
IMM GRANULOCYTES NFR BLD AUTO: 1 % (ref 0–5)
LYMPHOCYTES # BLD: 1.6 K/UL (ref 0.5–4.6)
LYMPHOCYTES NFR BLD: 9 % (ref 13–44)
MCH RBC QN AUTO: 29.7 PG (ref 26.1–32.9)
MCHC RBC AUTO-ENTMCNC: 31.7 G/DL (ref 31.4–35)
MCV RBC AUTO: 93.7 FL (ref 79.6–97.8)
MM INDURATION, POC: 0 MM (ref 0–5)
MONOCYTES # BLD: 1.9 K/UL (ref 0.1–1.3)
MONOCYTES NFR BLD: 11 % (ref 4–12)
NEUTS SEG # BLD: 13.4 K/UL (ref 1.7–8.2)
NEUTS SEG NFR BLD: 77 % (ref 43–78)
NRBC # BLD: 0 K/UL (ref 0–0.2)
PLATELET # BLD AUTO: 279 K/UL (ref 150–450)
PMV BLD AUTO: 9.6 FL (ref 9.4–12.3)
POTASSIUM SERPL-SCNC: 3.7 MMOL/L (ref 3.5–5.1)
PPD, POC: NEGATIVE
RBC # BLD AUTO: 4.11 M/UL (ref 4.23–5.6)
SODIUM SERPL-SCNC: 135 MMOL/L (ref 138–145)
WBC # BLD AUTO: 17.4 K/UL (ref 4.3–11.1)

## 2022-09-25 PROCEDURE — 87077 CULTURE AEROBIC IDENTIFY: CPT

## 2022-09-25 PROCEDURE — 2580000003 HC RX 258: Performed by: ANESTHESIOLOGY

## 2022-09-25 PROCEDURE — 6370000000 HC RX 637 (ALT 250 FOR IP): Performed by: INTERNAL MEDICINE

## 2022-09-25 PROCEDURE — 2500000003 HC RX 250 WO HCPCS: Performed by: SURGERY

## 2022-09-25 PROCEDURE — 87186 SC STD MICRODIL/AGAR DIL: CPT

## 2022-09-25 PROCEDURE — 3700000000 HC ANESTHESIA ATTENDED CARE: Performed by: SURGERY

## 2022-09-25 PROCEDURE — 87205 SMEAR GRAM STAIN: CPT

## 2022-09-25 PROCEDURE — 2580000003 HC RX 258: Performed by: INTERNAL MEDICINE

## 2022-09-25 PROCEDURE — 6360000002 HC RX W HCPCS: Performed by: INTERNAL MEDICINE

## 2022-09-25 PROCEDURE — 80048 BASIC METABOLIC PNL TOTAL CA: CPT

## 2022-09-25 PROCEDURE — 3600000002 HC SURGERY LEVEL 2 BASE: Performed by: SURGERY

## 2022-09-25 PROCEDURE — 2700000000 HC OXYGEN THERAPY PER DAY

## 2022-09-25 PROCEDURE — 3600000012 HC SURGERY LEVEL 2 ADDTL 15MIN: Performed by: SURGERY

## 2022-09-25 PROCEDURE — 6370000000 HC RX 637 (ALT 250 FOR IP): Performed by: ANESTHESIOLOGY

## 2022-09-25 PROCEDURE — 6360000002 HC RX W HCPCS: Performed by: FAMILY MEDICINE

## 2022-09-25 PROCEDURE — 87075 CULTR BACTERIA EXCEPT BLOOD: CPT

## 2022-09-25 PROCEDURE — 87185 SC STD ENZYME DETCJ PER NZM: CPT

## 2022-09-25 PROCEDURE — 3700000001 HC ADD 15 MINUTES (ANESTHESIA): Performed by: SURGERY

## 2022-09-25 PROCEDURE — 7100000001 HC PACU RECOVERY - ADDTL 15 MIN: Performed by: SURGERY

## 2022-09-25 PROCEDURE — 6360000002 HC RX W HCPCS: Performed by: ANESTHESIOLOGY

## 2022-09-25 PROCEDURE — 85025 COMPLETE CBC W/AUTO DIFF WBC: CPT

## 2022-09-25 PROCEDURE — 0X940ZZ DRAINAGE OF RIGHT AXILLA, OPEN APPROACH: ICD-10-PCS | Performed by: SURGERY

## 2022-09-25 PROCEDURE — 2500000003 HC RX 250 WO HCPCS: Performed by: ANESTHESIOLOGY

## 2022-09-25 PROCEDURE — 36415 COLL VENOUS BLD VENIPUNCTURE: CPT

## 2022-09-25 PROCEDURE — 2580000003 HC RX 258: Performed by: FAMILY MEDICINE

## 2022-09-25 PROCEDURE — 2709999900 HC NON-CHARGEABLE SUPPLY: Performed by: SURGERY

## 2022-09-25 PROCEDURE — 1100000000 HC RM PRIVATE

## 2022-09-25 PROCEDURE — 7100000000 HC PACU RECOVERY - FIRST 15 MIN: Performed by: SURGERY

## 2022-09-25 PROCEDURE — 6370000000 HC RX 637 (ALT 250 FOR IP): Performed by: FAMILY MEDICINE

## 2022-09-25 PROCEDURE — 87076 CULTURE ANAEROBE IDENT EACH: CPT

## 2022-09-25 RX ORDER — SODIUM CHLORIDE, SODIUM LACTATE, POTASSIUM CHLORIDE, CALCIUM CHLORIDE 600; 310; 30; 20 MG/100ML; MG/100ML; MG/100ML; MG/100ML
INJECTION, SOLUTION INTRAVENOUS
Status: COMPLETED | OUTPATIENT
Start: 2022-09-25 | End: 2022-09-25

## 2022-09-25 RX ORDER — CARVEDILOL 6.25 MG/1
6.25 TABLET ORAL 2 TIMES DAILY WITH MEALS
Status: DISCONTINUED | OUTPATIENT
Start: 2022-09-25 | End: 2022-09-26

## 2022-09-25 RX ORDER — LIDOCAINE HYDROCHLORIDE 20 MG/ML
INJECTION, SOLUTION EPIDURAL; INFILTRATION; INTRACAUDAL; PERINEURAL PRN
Status: DISCONTINUED | OUTPATIENT
Start: 2022-09-25 | End: 2022-09-25 | Stop reason: SDUPTHER

## 2022-09-25 RX ORDER — PROPOFOL 10 MG/ML
INJECTION, EMULSION INTRAVENOUS PRN
Status: DISCONTINUED | OUTPATIENT
Start: 2022-09-25 | End: 2022-09-25 | Stop reason: SDUPTHER

## 2022-09-25 RX ORDER — GLYCOPYRROLATE 0.2 MG/ML
INJECTION INTRAMUSCULAR; INTRAVENOUS PRN
Status: DISCONTINUED | OUTPATIENT
Start: 2022-09-25 | End: 2022-09-25 | Stop reason: SDUPTHER

## 2022-09-25 RX ORDER — ACETAMINOPHEN 500 MG
1000 TABLET ORAL
Status: COMPLETED | OUTPATIENT
Start: 2022-09-25 | End: 2022-09-25

## 2022-09-25 RX ORDER — ONDANSETRON 2 MG/ML
4 INJECTION INTRAMUSCULAR; INTRAVENOUS
Status: DISCONTINUED | OUTPATIENT
Start: 2022-09-25 | End: 2022-09-25 | Stop reason: HOSPADM

## 2022-09-25 RX ORDER — BUPIVACAINE HYDROCHLORIDE AND EPINEPHRINE 5; 5 MG/ML; UG/ML
INJECTION, SOLUTION PERINEURAL PRN
Status: DISCONTINUED | OUTPATIENT
Start: 2022-09-25 | End: 2022-09-25 | Stop reason: HOSPADM

## 2022-09-25 RX ORDER — KETAMINE HYDROCHLORIDE 50 MG/ML
INJECTION, SOLUTION, CONCENTRATE INTRAMUSCULAR; INTRAVENOUS PRN
Status: DISCONTINUED | OUTPATIENT
Start: 2022-09-25 | End: 2022-09-25 | Stop reason: SDUPTHER

## 2022-09-25 RX ORDER — MIDAZOLAM HYDROCHLORIDE 1 MG/ML
INJECTION INTRAMUSCULAR; INTRAVENOUS PRN
Status: DISCONTINUED | OUTPATIENT
Start: 2022-09-25 | End: 2022-09-25 | Stop reason: SDUPTHER

## 2022-09-25 RX ADMIN — CARVEDILOL 6.25 MG: 6.25 TABLET, FILM COATED ORAL at 16:45

## 2022-09-25 RX ADMIN — GLYCOPYRROLATE 0.2 MG: 0.2 INJECTION, SOLUTION INTRAMUSCULAR; INTRAVENOUS at 12:41

## 2022-09-25 RX ADMIN — VANCOMYCIN HYDROCHLORIDE 1250 MG: 10 INJECTION, POWDER, LYOPHILIZED, FOR SOLUTION INTRAVENOUS at 08:48

## 2022-09-25 RX ADMIN — SODIUM CHLORIDE, PRESERVATIVE FREE 10 ML: 5 INJECTION INTRAVENOUS at 08:48

## 2022-09-25 RX ADMIN — ACETAMINOPHEN 1000 MG: 500 TABLET, FILM COATED ORAL at 11:35

## 2022-09-25 RX ADMIN — AMLODIPINE BESYLATE 10 MG: 10 TABLET ORAL at 08:48

## 2022-09-25 RX ADMIN — PIPERACILLIN AND TAZOBACTAM 4500 MG: 4; .5 INJECTION, POWDER, LYOPHILIZED, FOR SOLUTION INTRAVENOUS at 04:50

## 2022-09-25 RX ADMIN — KETAMINE HYDROCHLORIDE 30 MG: 50 INJECTION, SOLUTION INTRAMUSCULAR; INTRAVENOUS at 12:41

## 2022-09-25 RX ADMIN — MORPHINE SULFATE 2 MG: 2 INJECTION, SOLUTION INTRAMUSCULAR; INTRAVENOUS at 20:15

## 2022-09-25 RX ADMIN — LIDOCAINE HYDROCHLORIDE 100 MG: 20 INJECTION, SOLUTION EPIDURAL; INFILTRATION; INTRACAUDAL; PERINEURAL at 12:41

## 2022-09-25 RX ADMIN — MORPHINE SULFATE 2 MG: 2 INJECTION, SOLUTION INTRAMUSCULAR; INTRAVENOUS at 04:26

## 2022-09-25 RX ADMIN — SODIUM CHLORIDE, POTASSIUM CHLORIDE, SODIUM LACTATE AND CALCIUM CHLORIDE: 600; 310; 30; 20 INJECTION, SOLUTION INTRAVENOUS at 11:10

## 2022-09-25 RX ADMIN — Medication 250 MG: at 21:40

## 2022-09-25 RX ADMIN — PIPERACILLIN AND TAZOBACTAM 4500 MG: 4; .5 INJECTION, POWDER, LYOPHILIZED, FOR SOLUTION INTRAVENOUS at 12:21

## 2022-09-25 RX ADMIN — MIDAZOLAM 2 MG: 1 INJECTION INTRAMUSCULAR; INTRAVENOUS at 12:41

## 2022-09-25 RX ADMIN — CARVEDILOL 6.25 MG: 6.25 TABLET, FILM COATED ORAL at 08:47

## 2022-09-25 RX ADMIN — LABETALOL HYDROCHLORIDE 10 MG: 5 INJECTION INTRAVENOUS at 01:02

## 2022-09-25 RX ADMIN — MORPHINE SULFATE 2 MG: 2 INJECTION, SOLUTION INTRAMUSCULAR; INTRAVENOUS at 14:45

## 2022-09-25 RX ADMIN — PROPOFOL 20 MG: 10 INJECTION, EMULSION INTRAVENOUS at 12:46

## 2022-09-25 RX ADMIN — HYDROCODONE BITARTRATE AND ACETAMINOPHEN 1 TABLET: 10; 325 TABLET ORAL at 00:58

## 2022-09-25 RX ADMIN — POTASSIUM CHLORIDE 20 MEQ: 1500 TABLET, EXTENDED RELEASE ORAL at 08:47

## 2022-09-25 RX ADMIN — HYDROCHLOROTHIAZIDE 25 MG: 25 TABLET ORAL at 08:48

## 2022-09-25 RX ADMIN — ACETAMINOPHEN 650 MG: 325 TABLET ORAL at 06:43

## 2022-09-25 RX ADMIN — SPIRONOLACTONE 50 MG: 25 TABLET ORAL at 08:47

## 2022-09-25 RX ADMIN — HYDROCODONE BITARTRATE AND ACETAMINOPHEN 1 TABLET: 10; 325 TABLET ORAL at 08:48

## 2022-09-25 RX ADMIN — KETAMINE HYDROCHLORIDE 30 MG: 50 INJECTION, SOLUTION INTRAMUSCULAR; INTRAVENOUS at 12:46

## 2022-09-25 RX ADMIN — GLYCOPYRROLATE 0.2 MG: 0.2 INJECTION, SOLUTION INTRAMUSCULAR; INTRAVENOUS at 12:29

## 2022-09-25 RX ADMIN — ENOXAPARIN SODIUM 60 MG: 100 INJECTION SUBCUTANEOUS at 21:39

## 2022-09-25 RX ADMIN — LOSARTAN POTASSIUM 50 MG: 50 TABLET, FILM COATED ORAL at 08:48

## 2022-09-25 RX ADMIN — ANTI-FUNGAL POWDER MICONAZOLE NITRATE TALC FREE: 1.42 POWDER TOPICAL at 08:49

## 2022-09-25 RX ADMIN — POTASSIUM CHLORIDE 20 MEQ: 1500 TABLET, EXTENDED RELEASE ORAL at 16:45

## 2022-09-25 RX ADMIN — SODIUM CHLORIDE, PRESERVATIVE FREE 10 ML: 5 INJECTION INTRAVENOUS at 21:39

## 2022-09-25 RX ADMIN — PIPERACILLIN AND TAZOBACTAM 4500 MG: 4; .5 INJECTION, POWDER, LYOPHILIZED, FOR SOLUTION INTRAVENOUS at 21:38

## 2022-09-25 RX ADMIN — Medication 250 MG: at 08:48

## 2022-09-25 RX ADMIN — VANCOMYCIN HYDROCHLORIDE 1250 MG: 10 INJECTION, POWDER, LYOPHILIZED, FOR SOLUTION INTRAVENOUS at 01:29

## 2022-09-25 RX ADMIN — VANCOMYCIN HYDROCHLORIDE 1250 MG: 10 INJECTION, POWDER, LYOPHILIZED, FOR SOLUTION INTRAVENOUS at 16:39

## 2022-09-25 ASSESSMENT — PAIN DESCRIPTION - ORIENTATION
ORIENTATION: RIGHT

## 2022-09-25 ASSESSMENT — PAIN DESCRIPTION - DESCRIPTORS
DESCRIPTORS: ACHING

## 2022-09-25 ASSESSMENT — PAIN SCALES - GENERAL
PAINLEVEL_OUTOF10: 0
PAINLEVEL_OUTOF10: 10
PAINLEVEL_OUTOF10: 0
PAINLEVEL_OUTOF10: 0
PAINLEVEL_OUTOF10: 10
PAINLEVEL_OUTOF10: 6
PAINLEVEL_OUTOF10: 0
PAINLEVEL_OUTOF10: 8
PAINLEVEL_OUTOF10: 8
PAINLEVEL_OUTOF10: 0

## 2022-09-25 ASSESSMENT — PAIN DESCRIPTION - ONSET
ONSET: ON-GOING
ONSET: AWAKENED FROM SLEEP

## 2022-09-25 ASSESSMENT — PAIN - FUNCTIONAL ASSESSMENT
PAIN_FUNCTIONAL_ASSESSMENT: 0-10
PAIN_FUNCTIONAL_ASSESSMENT: 0-10
PAIN_FUNCTIONAL_ASSESSMENT: NONE - DENIES PAIN

## 2022-09-25 ASSESSMENT — PAIN DESCRIPTION - LOCATION
LOCATION: ABDOMEN
LOCATION: BREAST
LOCATION: HEAD
LOCATION: BREAST
LOCATION: BREAST

## 2022-09-25 ASSESSMENT — PAIN DESCRIPTION - FREQUENCY
FREQUENCY: CONTINUOUS
FREQUENCY: CONTINUOUS

## 2022-09-25 ASSESSMENT — PAIN DESCRIPTION - PAIN TYPE: TYPE: ACUTE PAIN

## 2022-09-25 NOTE — ANESTHESIA PRE PROCEDURE
(HYDRODIURIL) tablet 25 mg  25 mg Oral Daily Nalini Ramos MD   25 mg at 09/25/22 0848    sodium chloride flush 0.9 % injection 5-40 mL  5-40 mL IntraVENous 2 times per day Nalini Ramos MD   10 mL at 09/25/22 0848    sodium chloride flush 0.9 % injection 5-40 mL  5-40 mL IntraVENous PRN Nalini Ramos MD        0.9 % sodium chloride infusion   IntraVENous PRN Nalini Ramos MD        enoxaparin (LOVENOX) injection 60 mg  60 mg SubCUTAneous BID Nalini Ramos MD   60 mg at 09/24/22 2123    ondansetron (ZOFRAN-ODT) disintegrating tablet 4 mg  4 mg Oral Q8H PRN Nalini Ramos MD   4 mg at 09/24/22 1837    Or    ondansetron (ZOFRAN) injection 4 mg  4 mg IntraVENous Q6H PRN Nalini Ramos MD        polyethylene glycol (GLYCOLAX) packet 17 g  17 g Oral Daily PRN Nalini Ramos MD        acetaminophen (TYLENOL) tablet 650 mg  650 mg Oral Q6H PRN Nalini Ramos MD   650 mg at 09/25/22 9986    Or    acetaminophen (TYLENOL) suppository 650 mg  650 mg Rectal Q6H PRN Nalini Ramos MD        potassium chloride (KLOR-CON M) extended release tablet 20 mEq  20 mEq Oral TID WC Nalini Ramos MD   20 mEq at 09/25/22 0847    HYDROcodone-acetaminophen (NORCO)  MG per tablet 1 tablet  1 tablet Oral Q6H PRN Nalini Ramos MD   1 tablet at 09/25/22 0848    spironolactone (ALDACTONE) tablet 50 mg  50 mg Oral Daily Nalini Ramos MD   50 mg at 09/25/22 0847    piperacillin-tazobactam (ZOSYN) 4,500 mg in sodium chloride 0.9 % 100 mL IVPB (mini-bag)  4,500 mg IntraVENous Juan M Haddad MD   Stopped at 09/25/22 0852    labetalol (NORMODYNE;TRANDATE) injection 10 mg  10 mg IntraVENous Q6H PRN Whitney Haynes MD   10 mg at 09/25/22 0102       Allergies:  No Known Allergies    Problem List:    Patient Active Problem List   Diagnosis Code    Depression F32. A    Essential hypertension I10    Morbid obesity with BMI of 70 and over, adult ([facility] E66.01, Z68.45    Cellulitis of right axilla L03. 111    Sepsis ([facility] A41.9       Past Medical History:        Diagnosis Date    Hypertension        Past Surgical History:        Procedure Laterality Date    CYST REMOVAL      TONSILLECTOMY         Social History:    Social History     Tobacco Use    Smoking status: Never    Smokeless tobacco: Never   Substance Use Topics    Alcohol use: Never                                Counseling given: Not Answered      Vital Signs (Current):   Vitals:    09/25/22 0757 09/25/22 1055 09/25/22 1057 09/25/22 1059   BP: (!) 158/79 (!) 140/77     Pulse: 83      Resp: 18 20     Temp: 99 °F (37.2 °C) 97.2 °F (36.2 °C)     TempSrc: Oral Oral     SpO2: 94% (!) 89% (!) 87% 92%   Weight:       Height:                                                  BP Readings from Last 3 Encounters:   09/25/22 (!) 140/77   09/20/22 (!) 197/109       NPO Status: Time of last liquid consumption: 2100                        Time of last solid consumption: 2100                        Date of last liquid consumption: 09/24/22                        Date of last solid food consumption: 09/24/22    BMI:   Wt Readings from Last 3 Encounters:   09/25/22 (!) 495 lb 14.4 oz (224.9 kg)   09/20/22 (!) 480 lb (217.7 kg)     Body mass index is 80.04 kg/m².     CBC:   Lab Results   Component Value Date/Time    WBC 21.3 09/24/2022 04:00 AM    RBC 4.12 09/24/2022 04:00 AM    HGB 12.2 09/24/2022 04:00 AM    HCT 38.3 09/24/2022 04:00 AM    MCV 93.0 09/24/2022 04:00 AM    RDW 14.0 09/24/2022 04:00 AM     09/24/2022 04:00 AM       CMP:   Lab Results   Component Value Date/Time     09/24/2022 04:00 AM    K 3.4 09/24/2022 04:00 AM    CL 98 09/24/2022 04:00 AM    CO2 34 09/24/2022 04:00 AM    BUN 11 09/24/2022 04:00 AM    CREATININE 1.20 09/24/2022 04:00 AM    GFRAA >60 09/24/2022 04:00 AM    LABGLOM >60 09/24/2022 04:00 AM    GLUCOSE 128 09/24/2022 04:00 AM    PROT 8.5 09/22/2022 07:30 PM    CALCIUM 8.8 09/24/2022 04:00 AM    BILITOT 1.8 09/22/2022 07:30 PM    ALKPHOS 78 09/22/2022 07:30 PM    AST 9 09/22/2022 07:30 PM    ALT 10 09/22/2022 07:30 PM       POC Tests: No results for input(s): POCGLU, POCNA, POCK, POCCL, POCBUN, POCHEMO, POCHCT in the last 72 hours. Coags: No results found for: PROTIME, INR, APTT    HCG (If Applicable): No results found for: PREGTESTUR, PREGSERUM, HCG, HCGQUANT     ABGs: No results found for: PHART, PO2ART, FNT0HSS, YHN6LUL, BEART, F6UVAPIR     Type & Screen (If Applicable):  No results found for: LABABO, LABRH    Drug/Infectious Status (If Applicable):  No results found for: HIV, HEPCAB    COVID-19 Screening (If Applicable):   Lab Results   Component Value Date/Time    COVID19 Not detected 09/24/2022 03:54 PM           Anesthesia Evaluation  Patient summary reviewed and Nursing notes reviewed   history of anesthetic complications (with last anesthetic per record, got into a cannot ventilate/cannot intubate scenario, rescued with LMA and then intubated with a glidescope): history of difficult intubation. Airway: Mallampati: IV  TM distance: >3 FB   Neck ROM: full  Comment: Mccarthy, known difficult  Mouth opening: < 3 FB   Dental: normal exam         Pulmonary:   (+) sleep apnea: on noncompliant,  decreased breath sounds: bilateral                            Cardiovascular:  Exercise tolerance: poor (<4 METS), Limited by morbid obesity  (+) hypertension:,         Rhythm: regular  Rate: normal                    Neuro/Psych:   (+) depression/anxiety             GI/Hepatic/Renal:   (+) morbid obesity (supramorbid obesity)          Endo/Other: Negative Endo/Other ROS                    Abdominal:             Vascular: Other Findings:           Anesthesia Plan      TIVA     ASA 3 - emergent             Anesthetic plan and risks discussed with patient.                         Adry Kaur MD   9/25/2022

## 2022-09-25 NOTE — ANESTHESIA POSTPROCEDURE EVALUATION
Department of Anesthesiology  Postprocedure Note    Patient: Ruby Moreau  MRN: [id number]  YOB: 1980  Date of evaluation: 9/25/2022      Procedure Summary     Date: 09/25/22 Room / Location: [facility] MAIN OR 09 / [facility] MAIN OR    Anesthesia Start: 1234 Anesthesia Stop: 1301    Procedure: RIGHT AXILLARY IRRIGATION AND DEBRIDEMENT (Right: Breast) Diagnosis:       Axillary abscess      (right axillary abscess)    Providers: Abigail Hernandez MD Responsible Provider:     Anesthesia Type: TIVA ASA Status: 3 - Emergent          Anesthesia Type: TIVA    Franck Phase I: Franck Score: 9    Franck Phase II:        Anesthesia Post Evaluation    Patient location during evaluation: PACU  Patient participation: complete - patient participated  Level of consciousness: awake  Airway patency: patent  Nausea & Vomiting: no nausea  Complications: no  Cardiovascular status: blood pressure returned to baseline  Respiratory status: acceptable  Hydration status: euvolemic  Multimodal analgesia pain management approach

## 2022-09-25 NOTE — PERIOP NOTE
TRANSFER - IN REPORT:    Verbal report received from Sjdarlin Lux on [address] being received from [phone] for ordered procedure      Report consisted of patients Situation, Background, Assessment and Recommendations(SBAR). Information from the following report(s) SBAR, Kardex, MAR, Recent Results, Procedure Verification, and Quality Measures was reviewed with the receiving nurse. Opportunity for questions and clarification was provided. Assessment completed upon patients arrival to unit and care assumed.

## 2022-09-25 NOTE — CONSULTS
H&P/Consult Note/Progress Note/Office Note:   Chip Suárez  MRN: [id number]  :1980  Age:41 y.o.    HPI: Chip Suárez is a 37 y.o. male with medical history of Morbid obesity, previous cellulitis, depression and HTN who presented with fever and right upper extremity pain.   Patient states that on 22 he started to notice cysts with pain appearing in his right axilla with the pain radiating down the right side toward his breast. He is now febrile, with cellulitic changes in the area          Past Medical History:   Diagnosis Date    Hypertension      Past Surgical History:   Procedure Laterality Date    CYST REMOVAL      TONSILLECTOMY       Current Facility-Administered Medications   Medication Dose Route Frequency    carvedilol (COREG) tablet 6.25 mg  6.25 mg Oral BID WC    vancomycin (VANCOCIN) 1250 mg in sodium chloride 0.9% 250 mL IVPB  1,250 mg IntraVENous Q8H    saccharomyces boulardii (FLORASTOR) capsule 250 mg  250 mg Oral BID    morphine injection 2 mg  2 mg IntraVENous Q3H PRN    miconazole (MICOTIN) 2 % powder   Topical BID    losartan (COZAAR) tablet 50 mg  50 mg Oral Daily    amLODIPine (NORVASC) tablet 10 mg  10 mg Oral Daily    hydroCHLOROthiazide (HYDRODIURIL) tablet 25 mg  25 mg Oral Daily    sodium chloride flush 0.9 % injection 5-40 mL  5-40 mL IntraVENous 2 times per day    sodium chloride flush 0.9 % injection 5-40 mL  5-40 mL IntraVENous PRN    0.9 % sodium chloride infusion   IntraVENous PRN    enoxaparin (LOVENOX) injection 60 mg  60 mg SubCUTAneous BID    ondansetron (ZOFRAN-ODT) disintegrating tablet 4 mg  4 mg Oral Q8H PRN    Or    ondansetron (ZOFRAN) injection 4 mg  4 mg IntraVENous Q6H PRN    polyethylene glycol (GLYCOLAX) packet 17 g  17 g Oral Daily PRN    acetaminophen (TYLENOL) tablet 650 mg  650 mg Oral Q6H PRN    Or    acetaminophen (TYLENOL) suppository 650 mg  650 mg Rectal Q6H PRN    potassium chloride (KLOR-CON M) extended release tablet 20 mEq  20 mEq Oral TID WC    HYDROcodone-acetaminophen (NORCO)  MG per tablet 1 tablet  1 tablet Oral Q6H PRN    spironolactone (ALDACTONE) tablet 50 mg  50 mg Oral Daily    piperacillin-tazobactam (ZOSYN) 4,500 mg in sodium chloride 0.9 % 100 mL IVPB (mini-bag)  4,500 mg IntraVENous Q8H    labetalol (NORMODYNE;TRANDATE) injection 10 mg  10 mg IntraVENous Q6H PRN     ALLERGIES:  Patient has no known allergies. Social History     Socioeconomic History    Marital status: Single   Tobacco Use    Smoking status: Never    Smokeless tobacco: Never   Substance and Sexual Activity    Alcohol use: Never    Drug use: Never     Social History     Tobacco Use   Smoking Status Never   Smokeless Tobacco Never     No family history on file. ROS: The patient has no difficulty with chest pain or shortness of breath. No fever or chills. Comprehensive review of systems was otherwise unremarkable except as noted above. Physical Exam:   BP (!) 158/79   Pulse 83   Temp 99 °F (37.2 °C) (Oral)   Resp 18   Ht 5' 6\" (1.676 m)   Wt (!) 495 lb 14.4 oz (224.9 kg)   SpO2 94%   BMI 80.04 kg/m²   Vitals:    09/25/22 0001 09/25/22 0058 09/25/22 0517 09/25/22 0757   BP: (!) 151/86 (!) 164/87 (!) 163/91 (!) 158/79   Pulse: 91  92 83   Resp: 18  18 18   Temp: 99.1 °F (37.3 °C)  98.7 °F (37.1 °C) 99 °F (37.2 °C)   TempSrc: Oral  Oral Oral   SpO2: 91%  100% 94%   Weight:   (!) 495 lb 14.4 oz (224.9 kg)    Height:         No intake/output data recorded. 09/23 1901 - 09/25 0700  In: 940 [P.O.:940]  Out: 1250 [Urine:1250]    Constitutional: Alert, oriented, cooperative patient in no acute distress; appears stated age    Eyes:Sclera are clear. EOMs intact  ENMT: no external lesions gross hearing normal; no obvious neck masses, no ear or lip lesions, nares normal  CV: RRR. Normal perfusion  Resp: No JVD. Breathing is  non-labored; no audible wheezing. GI: soft and non-distended     Musculoskeletal: unremarkable with normal function.  No embolic signs or cyanosis. Right axillar/pectoralis fold with erythema and induration. Neuro:  Oriented; moves all 4; no focal deficits  Psychiatric: normal affect and mood, no memory impairment    Recent vitals (if inpt):  Patient Vitals for the past 24 hrs:   BP Temp Temp src Pulse Resp SpO2 Weight   09/25/22 0757 (!) 158/79 99 °F (37.2 °C) Oral 83 18 94 % --   09/25/22 0517 (!) 163/91 98.7 °F (37.1 °C) Oral 92 18 100 % (!) 495 lb 14.4 oz (224.9 kg)   09/25/22 0058 (!) 164/87 -- -- -- -- -- --   09/25/22 0001 (!) 151/86 99.1 °F (37.3 °C) Oral 91 18 91 % --   09/24/22 2038 (!) 150/91 (!) 100.9 °F (38.3 °C) Oral 94 18 97 % --   09/24/22 1730 (!) 165/97 -- -- -- -- -- --   09/24/22 1649 -- -- -- -- 18 -- --   09/24/22 1642 (!) 171/101 99.3 °F (37.4 °C) Oral 95 22 93 % --   09/24/22 1453 (!) 166/97 -- -- -- -- -- --   09/24/22 1445 -- -- -- -- 18 -- --   09/24/22 1258 (!) 145/83 98.8 °F (37.1 °C) Oral 91 19 92 % --   09/24/22 1127 -- -- -- -- -- 99 % --   09/24/22 1059 -- -- -- -- 18 -- --   09/24/22 1056 (!) 163/100 -- -- -- -- -- --       Amount and/or Complexity of Data Reviewed and Analyzed:  I reviewed and analyzed all of the unique labs and radiologic studies that are shown below as well as any that are in the HPI, and any that are in the expanded problem list below  *Each unique test, order, or document contributes to the combination of 2 or combination of 3 in Category 1 below. For this visit I also reviewed old records and prior notes. Recent Labs     09/22/22  1930 09/23/22  0716 09/24/22  0400   WBC 19.5*   < > 21.3*   HGB 13.8   < > 12.2*      < > 298   *   < > 134*   K 3.2*   < > 3.4*   CL 98*   < > 98*   CO2 33*   < > 34*   BUN 17   < > 11   ALT 10*  --   --     < > = values in this interval not displayed.      Review of most recent CBC  Lab Results   Component Value Date    WBC 21.3 (H) 09/24/2022    HGB 12.2 (L) 09/24/2022    HCT 38.3 (L) 09/24/2022    MCV 93.0 09/24/2022     09/24/2022       Review of most recent BMP  Lab Results   Component Value Date/Time     09/24/2022 04:00 AM    K 3.4 09/24/2022 04:00 AM    CL 98 09/24/2022 04:00 AM    CO2 34 09/24/2022 04:00 AM    BUN 11 09/24/2022 04:00 AM    CREATININE 1.20 09/24/2022 04:00 AM    GLUCOSE 128 09/24/2022 04:00 AM    CALCIUM 8.8 09/24/2022 04:00 AM        Review of most recent LFTs (and lipase if done)  Lab Results   Component Value Date    ALT 10 (L) 09/22/2022    AST 9 (L) 09/22/2022    ALKPHOS 78 09/22/2022    BILITOT 1.8 (H) 09/22/2022     No results found for: LIPASE    No results found for: INR, APTT, LCAD    Review of most recent HgbA1c  No results found for: LABA1C  No results found for: EAG    Nutritional assessment screen for wound healing issues:  Lab Results   Component Value Date    LABALBU 2.9 (L) 09/22/2022       @lastcovr@    Xray Result (most recent):  XR CHEST PORTABLE 09/22/2022    Narrative  EXAM: Chest x-ray. INDICATION: Dyspnea. COMPARISON: None. TECHNIQUE: Single frontal view. FINDINGS: The lungs are clear. The cardiac size, mediastinal contour and  pulmonary vasculature are within normal limits for the portable technique. No  pneumothorax or pleural effusion is seen. The bones are intact. Impression  No acute process. CT Result (most recent):  CT CHEST ABDOMEN PELVIS W CONTRAST 09/22/2022    Narrative  EXAM: CT chest, abdomen and pelvis with IV contrast.    INDICATION: Pain. COMPARISON: None. TECHNIQUE: Axial CT images of the chest, abdomen and pelvis were obtained after  the intravenous injection of 100 mL Isovue 370 CT contrast. Radiation dose  reduction techniques were used for this study. Our CT scanners use one or all  of the following:  Automated exposure control, adjustment of the mA and/or kV  according to patient size, iterative reconstruction. FINDINGS:  - Pleura/pericardium: Within normal limits. - Lungs: Within normal limits.   - Selin/Mediastinum: Within normal limits. - Tracheobronchial tree: Within normal limits. - Aorta/pulmonary arteries: Within normal limits. - Heart: Within normal limits. - Coronary arteries: No coronary artery calcifications. - Chest wall: There is edema in the anterolateral right chest wall subcutaneous  tissue, with skin thickening along the axilla and a few mildly enlarged right  axillary lymph nodes measuring up to 4.1 cm in diameter. No soft tissue gas or  fluid collection is seen. - Spine/bones: No acute process. - Additional comments: None. - Liver: Within normal limits. - Gallbladder and bile ducts: Within normal limits. - Spleen: Within normal limits. - Urinary tract: Within normal limits. - Adrenals: Within normal limits. - Pancreas: Within normal limits. - Gastrointestinal tract: Within normal limits. - Retroperitoneum: Within normal limits. - Peritoneal cavity and abdominal wall: Within normal limits. - Pelvis: There is a small fat-containing right inguinal hernia. - Spine/bones: No acute process. - Other comments: None. Impression  1. Changes suggestive of cellulitis in the right axilla and adjacent chest wall. 2. No acute intra-abdominal or intrathoracic process. US Result (most recent):  No results found for this or any previous visit from the past 3650 days. Admission date (for inpatients): 9/22/2022   * No surgery found *  * No surgery found *        ASSESSMENT/PLAN:  [unfilled]  Principal Problem:    Sepsis ([address]  Active Problems:    Depression    Essential hypertension    Morbid obesity with BMI of 70 and over, adult ([address]    Cellulitis of right axilla  Resolved Problems:    * No resolved hospital problems.  *     Patient Active Problem List    Diagnosis Date Noted    Morbid obesity with BMI of 70 and over, adult ([address] 09/23/2022     Priority: Medium    Cellulitis of right axilla 09/23/2022     Priority: Medium    Sepsis ([address] 09/23/2022     Priority: Medium    Depression 03/22/2016     Priority:

## 2022-09-25 NOTE — PERIOP NOTE
TRANSFER - OUT REPORT:  318  Site Assessment Clean, dry & intact 09/25/22 1258   Line Status Infusing 09/25/22 100[address] [facility] [address] Connections checked and tightened 09/25/22 1258   Phlebitis Assessment No symptoms 09/25/22 1258   Infiltration Assessment 0 09/25/22 1258   Alcohol Cap Used No 09/25/22 1258   Dressing Status Clean, dry & intact 09/25/22 1258   Dressing Type Transparent 09/25/22 1258       Peripheral IV 09/25/22 Left;Posterior Hand (Active)   Site Assessment Clean, dry & intact 09/25/22 1258   Line Status Infusing 09/25/22 1001 [facility] [address] Connections checked and tightened 09/25/22 1258   Phlebitis Assessment No symptoms 09/25/22 1258   Infiltration Assessment 0 09/25/22 1258   Alcohol Cap Used No 09/25/22 1258   Dressing Status Clean, dry & intact 09/25/22 1258   Dressing Type Transparent 09/25/22 1258   Dressing Intervention New 09/25/22 1120        Opportunity for questions and clarification was provided. Patient transported with:   O2 @ 3 liters    VTE prophylaxis orders have been written for [employer]. Patient and family given floor number and nurses name. Family updated re: pt status after security code verified.

## 2022-09-25 NOTE — PROGRESS NOTES
VANCO DAILY FOLLOW UP NOTE  [address] [facility] Pharmacokinetic Monitoring Service - Vancomycin    Consulting Provider: Yumi Daily   Indication: SSTI  Target Concentration: Goal trough of 10-15 mg/L and AUC/CARLO <500 mg*hr/L  Day of Therapy: 3 of 5  Additional Antimicrobials: none    Pertinent Laboratory Values: Wt Readings from Last 1 Encounters:   09/25/22 (!) 495 lb 14.4 oz (224.9 kg)     Temp Readings from Last 1 Encounters:   09/25/22 98.2 °F (36.8 °C) (Temporal)     Recent Labs     09/22/22  1930 09/23/22  0716 09/24/22  0400 09/24/22  0832   BUN 17 11 11  --    CREATININE 1.40 1.20 1.20  --    WBC 19.5* 24.5* 21.3*  --    PROCAL 0.28  --   --   --    LACACIDPL 0.9  --   --  1.0     Estimated Creatinine Clearance: 148 mL/min (based on SCr of 1.2 mg/dL). Lab Results   Component Value Date/Time    VANCORANDOM 9.3 09/24/2022 08:32 AM       MRSA Nasal Swab: N/A. Non-respiratory infection.       Assessment:  Date/Time Dose Concentration AUC   9/24 0832 1250 mg q12h 9.3 276   Note: Serum concentrations collected for AUC dosing may appear elevated if collected in close proximity to the dose administered, this is not necessarily an indication of toxicity    Plan:  Continue the current regimen of 1250 mg every 12 hours for now  Repeat vancomycin concentrations will be ordered as clinically appropriate   Pharmacy will continue to monitor patient and adjust therapy as indicated    Thank you for the consult,  Patricio Roberson, [facility]

## 2022-09-25 NOTE — OP NOTE
Operative Note      Patient: Marija Gerber  YOB: 1980  MRN: [id number]    Date of Procedure: 9/25/2022    Pre-Op Diagnosis: right axillary abscess    Post-Op Diagnosis: Right axilla/pectoralis fold abscess       Procedure(s):  RIGHT AXILLARY IRRIGATION AND DEBRIDEMENT    Surgeon(s):  Anand Munson MD    Assistant:   * No surgical staff found *    Anesthesia: General    Estimated Blood Loss (mL): 20 cc    Complications: none    Specimens:   Fluid culture             Findings: large abscess cavity 7 x 3 x 5 cm    Detailed Description of Procedure:   Brought to the OR  Sedated with propofol  40 cc of lidocaine with epi used to infiltrate the area  6 cm incision performed doun to the level of the ribs, large amount of purulent fluid drained, cavity was irrigated with sterile saline and packed with Kurlex gauze     Electronically signed by Mell Hanson MD on 9/25/2022 at 12:50 PM

## 2022-09-25 NOTE — PROGRESS NOTES
Hospitalist Progress Note   Admit Date:  2022 10:00 PM   Name:  Ap Jain   Age:  37 y.o. Sex:  male  :  1980   MRN:  [id number]   Room:  [facility]/    Presenting Complaint: Abscess     Reason(s) for Admission: Septicemia [facility]) [A41.9]  Abdominal wall cellulitis [T04.146]  Sepsis [facility]) [A41.9]     Hospital Course:   37 y.o. male with medical history of Morbid obesity, previous cellulitis, depression and HTN who presented with fever and right upper extremity pain. Patient states that on 22 he started to notice cysts with pain appearing in his right axilla with the pain radiating down the right side toward his breast.  He was evaluated at [facility] and was found to have cellulitis. He was given keflex and bactrim for treatment. While at home however he spiked a fever and the pain intensified despite taking the antibiotics. Patient was admitted for sepsis secondary to cellulitis of right axilla. CT chest abdomen and pelvis shows changes suggestive of cellulitis in the R axilla and adjacent chest wall; no acute intra-abdominal or intrathoracic process. Pt was started on Vanc/Zosyn on admission. Blood cultures obtained, though after abx initiated so far no growth. Large area of induration, general surgery consulted and planned for I&D . O2 sat hospital course was 85% and pt placed on 4LO2NC. ? OHS/SAROJ with severe morbid obesity BMI 77. Sleep studies in 10/2021 appeared to be consistent with severe SAROJ/OHS. Lost to follow up. Rapid Covid-19 negative. Per [facility] physician Dr. Roberta Warner:  Gonzalez De Santiago obstructive sleep apnea with an AHI of  67.8 events/hr, arousal index   of 0 per hour and a low saturation of 50%. Obstructive sleep apnea best treated on Bipap at 24 / 20 cmH2O, with a   large-sized, F-20, AIr-Fit, Resmed, Full Face Mask. There were no PLMS with an index of  0.0/hr and PLMS arousal index of 0 /hr.   CPAP was not sufficient in treating the patient's sleep disordered breathing. \"      Subjective & 24hr Events (09/25/22): Patient alert and oriented x3. Sitting up in recliner. Tmax 100.9 on 9/24, fever curve improving. NPO today with plan for I&D. On RA this AM.     No fever, chills, SOB, chest pain, abdominal pain, nausea, vomiting    Assessment & Plan:     Sepsis   Cellulitis of right axilla  Met criteria with T102.2, HR>100 with source. Failed outpatient therapy with Keflex and Bactrim. CT chest abdomen and pelvis shows changes suggestive of cellulitis in the R axilla and adjacent chest wall; no acute intra-abdominal or intrathoracic process  No BCX obtained on admission  Abx: Vanc/Zosyn (9/23-. ..)  BCX obtained, though after abx initiated: NGTD  MRSA negative  Consulted wound care  Large area of induration, general surgery consulted, appreciate recs  Surgery planned for I&D 9/25  Wound cultures ordered    Acute respiratory failure with hypoxia  Morbid obesity with BMI of 70  SAROJ/OHS  O2 sat 85% on RA and placed on 4LO2NC. CT chest on 9/23 was unremarkable overall  Sleep studies in 10/2021 appeared to be consistent with severe SAROJ/OHS. Lost to follow up. Rapid Covid-19 negative  Per [facility] Dr. Castillo Abimael:  \"Severe obstructive sleep apnea with an AHI of  67.8 events/hr, arousal index   of 0 per hour and a low saturation of 50%. Obstructive sleep apnea best treated on Bipap at 24 / 20 cmH2O, with a   large-sized, F-20, AIr-Fit, Resmed, Full Face Mask. There were no PLMS with an index of  0.0/hr and PLMS arousal index of 0 /hr. CPAP was not sufficient in treating the patient's sleep disordered breathing. \"  Will discuss with CM about home BiPAP to arrange for patient. ? If needing pulm consult. On  this AM 9/25  RT for walking pulse ox prior to discharge.      Hypokalemia  Replace and recheck  Check Mag  If worsens, will d/c Hctz    Essential hypertension  Continue home Norvasc, hydrochlorothiazide, losartan, spironolactone        Anticipated discharge needs: 1-2 days pending improvement. PT/OT--no further skilled therapy needed    I spent 37 minutes of time caring for this patient on the unit nearby or at bedside, and more than 50 percent was spent on coordination of care activities, and/or patient/family counseling regarding status and plan of care. Diet:  Diet NPO  DVT PPx: Lovenox SQ  Code status: Full Code    Hospital Problems:  Principal Problem:    Sepsis ([facility] [address]  Active Problems:    Depression    Essential hypertension    Morbid obesity with BMI of 70 and over, adult ([facility] [address]    Cellulitis of right axilla  Resolved Problems:    * No resolved hospital problems. *      Objective:   Patient Vitals for the past 24 hrs:   Temp Pulse Resp BP SpO2   09/25/22 0757 99 °F (37.2 °C) 83 18 (!) 158/79 94 %   09/25/22 0517 98.7 °F (37.1 °C) 92 18 (!) 163/91 100 %   09/25/22 0058 -- -- -- (!) 164/87 --   09/25/22 0001 99.1 °F (37.3 °C) 91 18 (!) 151/86 91 %   09/24/22 2038 (!) 100.9 °F (38.3 °C) 94 18 (!) 150/91 97 %   09/24/22 1730 -- -- -- (!) 165/97 --   09/24/22 1649 -- -- 18 -- --   09/24/22 1642 99.3 °F (37.4 °C) 95 22 (!) 171/101 93 %   09/24/22 1453 -- -- -- (!) 166/97 --   09/24/22 1445 -- -- 18 -- --   09/24/22 1258 98.8 °F (37.1 °C) 91 19 (!) 145/83 92 %   09/24/22 1127 -- -- -- -- 99 %   09/24/22 1059 -- -- 18 -- --   09/24/22 1056 -- -- -- (!) 163/100 --       Oxygen Therapy  SpO2: 94 %  Pulse Oximetry Type: Intermittent  O2 Device: None (Room air)  O2 Flow Rate (L/min): 2 L/min    Estimated body mass index is 80.04 kg/m² as calculated from the following:    Height as of this encounter: 5' 6\" (1.676 m). Weight as of this encounter: 495 lb 14.4 oz (224.9 kg). Intake/Output Summary (Last 24 hours) at 9/25/2022 1020  Last data filed at 9/25/2022 0129  Gross per 24 hour   Intake 580 ml   Output 700 ml   Net -120 ml         Physical Exam:     Blood pressure (!) 158/79, pulse 83, temperature 99 °F (37.2 °C), temperature source Oral, resp.  rate 18, height 5' 6\" (1.676 m), weight (!) 495 lb 14.4 oz (224.9 kg), SpO2 94 %. General:    Well nourished. Morbidly obese  Head:  Normocephalic, atraumatic  Eyes:  Sclerae appear normal.  Pupils equally round. ENT:  Nares appear normal, no drainage. Moist oral mucosa  Neck:  No restricted ROM. Trachea midline   CV:   RRR. No m/r/g. No jugular venous distension. Lungs:   CTAB. No wheezing, rhonchi, or rales. Symmetric expansion. Abdomen: Bowel sounds present. Soft, nontender, nondistended. Extremities: No cyanosis or clubbing. No edema  Skin:     R axilla extending to under R breast/ skin fold on RUQ with erythema and weeping induration, TTP. Neuro:  CN II-XII grossly intact. Sensation intact. A&Ox3  Psych:  Normal mood and affect.       I have personally reviewed labs and tests showing:  Recent Labs:  Recent Results (from the past 48 hour(s))   PLEASE READ & DOCUMENT PPD TEST IN 24 HRS    Collection Time: 09/24/22 12:00 AM   Result Value Ref Range    PPD, (POC) Negative Negative    mm Induration 0 0 - 5 mm   Basic Metabolic Panel w/ Reflex to MG    Collection Time: 09/24/22  4:00 AM   Result Value Ref Range    Sodium 134 (L) 138 - 145 mmol/L    Potassium 3.4 (L) 3.5 - 5.1 mmol/L    Chloride 98 (L) 101 - 110 mmol/L    CO2 34 (H) 21 - 32 mmol/L    Anion Gap 2 (L) 4 - 13 mmol/L    Glucose 128 (H) 65 - 100 mg/dL    BUN 11 6 - 23 MG/DL    Creatinine 1.20 0.8 - 1.5 MG/DL    GFR African American >60 >60 ml/min/1.73m2    GFR Non- >60 >60 ml/min/1.73m2    Calcium 8.8 8.3 - 10.4 MG/DL   CBC with Auto Differential    Collection Time: 09/24/22  4:00 AM   Result Value Ref Range    WBC 21.3 (H) 4.3 - 11.1 K/uL    RBC 4.12 (L) 4.23 - 5.6 M/uL    Hemoglobin 12.2 (L) 13.6 - 17.2 g/dL    Hematocrit 38.3 (L) 41.1 - 50.3 %    MCV 93.0 79.6 - 97.8 FL    MCH 29.6 26.1 - 32.9 PG    MCHC 31.9 31.4 - 35.0 g/dL    RDW 14.0 11.9 - 14.6 %    Platelets 471 944 - 401 K/uL    MPV 9.7 9.4 - 12.3 FL    nRBC 0.00 0.0 - 0.2 K/uL Differential Type AUTOMATED      Seg Neutrophils 81 (H) 43 - 78 %    Lymphocytes 6 (L) 13 - 44 %    Monocytes 12 4.0 - 12.0 %    Eosinophils % 1 0.5 - 7.8 %    Basophils 0 0.0 - 2.0 %    Immature Granulocytes 1 0.0 - 5.0 %    Segs Absolute 17.2 (H) 1.7 - 8.2 K/UL    Absolute Lymph # 1.3 0.5 - 4.6 K/UL    Absolute Mono # 2.5 (H) 0.1 - 1.3 K/UL    Absolute Eos # 0.1 0.0 - 0.8 K/UL    Basophils Absolute 0.0 0.0 - 0.2 K/UL    Absolute Immature Granulocyte 0.2 0.0 - 0.5 K/UL   Magnesium    Collection Time: 09/24/22  4:00 AM   Result Value Ref Range    Magnesium 1.8 1.8 - 2.4 mg/dL   Vancomycin Level, Random    Collection Time: 09/24/22  8:32 AM   Result Value Ref Range    Vancomycin Rm 9.3 UG/ML   Culture, Blood 1    Collection Time: 09/24/22  8:32 AM    Specimen: Blood   Result Value Ref Range    Special Requests RIGHT  HAND        Culture NO GROWTH 1 DAY     Lactic Acid    Collection Time: 09/24/22  8:32 AM   Result Value Ref Range    Lactic Acid, Plasma 1.0 0.4 - 2.0 MMOL/L   Culture, Blood 1    Collection Time: 09/24/22  8:38 AM    Specimen: Blood   Result Value Ref Range    Special Requests LEFT  HAND        Culture NO GROWTH 1 DAY     MSSA/MRSA Screen BY PCR    Collection Time: 09/24/22  3:54 PM    Specimen: Nares; Swab   Result Value Ref Range    Special Requests NO SPECIAL REQUESTS      Culture (A)       MRSA target DNA not detected, SA target DNA detected. A MRSA negative, SA positive test result does not preclude MRSA nasal colonization. COVID-19, Rapid    Collection Time: 09/24/22  3:54 PM    Specimen: Nasopharyngeal   Result Value Ref Range    Source Nasopharyngeal      SARS-CoV-2, Rapid Not detected NOTD         I have personally reviewed imaging studies showing: Other Studies:  CT CHEST ABDOMEN PELVIS W CONTRAST   Final Result   1. Changes suggestive of cellulitis in the right axilla and adjacent chest wall. 2. No acute intra-abdominal or intrathoracic process.          XR CHEST PORTABLE   Final Result   No acute process. Current Meds:  Current Facility-Administered Medications   Medication Dose Route Frequency    carvedilol (COREG) tablet 6.25 mg  6.25 mg Oral BID WC    vancomycin (VANCOCIN) 1250 mg in sodium chloride 0.9% 250 mL IVPB  1,250 mg IntraVENous Q8H    saccharomyces boulardii (FLORASTOR) capsule 250 mg  250 mg Oral BID    morphine injection 2 mg  2 mg IntraVENous Q3H PRN    miconazole (MICOTIN) 2 % powder   Topical BID    losartan (COZAAR) tablet 50 mg  50 mg Oral Daily    amLODIPine (NORVASC) tablet 10 mg  10 mg Oral Daily    hydroCHLOROthiazide (HYDRODIURIL) tablet 25 mg  25 mg Oral Daily    sodium chloride flush 0.9 % injection 5-40 mL  5-40 mL IntraVENous 2 times per day    sodium chloride flush 0.9 % injection 5-40 mL  5-40 mL IntraVENous PRN    0.9 % sodium chloride infusion   IntraVENous PRN    enoxaparin (LOVENOX) injection 60 mg  60 mg SubCUTAneous BID    ondansetron (ZOFRAN-ODT) disintegrating tablet 4 mg  4 mg Oral Q8H PRN    Or    ondansetron (ZOFRAN) injection 4 mg  4 mg IntraVENous Q6H PRN    polyethylene glycol (GLYCOLAX) packet 17 g  17 g Oral Daily PRN    acetaminophen (TYLENOL) tablet 650 mg  650 mg Oral Q6H PRN    Or    acetaminophen (TYLENOL) suppository 650 mg  650 mg Rectal Q6H PRN    potassium chloride (KLOR-CON M) extended release tablet 20 mEq  20 mEq Oral TID WC    HYDROcodone-acetaminophen (NORCO)  MG per tablet 1 tablet  1 tablet Oral Q6H PRN    spironolactone (ALDACTONE) tablet 50 mg  50 mg Oral Daily    piperacillin-tazobactam (ZOSYN) 4,500 mg in sodium chloride 0.9 % 100 mL IVPB (mini-bag)  4,500 mg IntraVENous Q8H    labetalol (NORMODYNE;TRANDATE) injection 10 mg  10 mg IntraVENous Q6H PRN       Signed: Sania Vargas DO    Part of this note may have been written by using a voice dictation software. The note has been proof read but may still contain some grammatical/other typographical errors.

## 2022-09-26 LAB
ANION GAP SERPL CALC-SCNC: 2 MMOL/L (ref 4–13)
BASOPHILS # BLD: 0.1 K/UL (ref 0–0.2)
BASOPHILS NFR BLD: 0 % (ref 0–2)
BUN SERPL-MCNC: 12 MG/DL (ref 6–23)
CALCIUM SERPL-MCNC: 9.4 MG/DL (ref 8.3–10.4)
CHLORIDE SERPL-SCNC: 95 MMOL/L (ref 101–110)
CO2 SERPL-SCNC: 38 MMOL/L (ref 21–32)
CREAT SERPL-MCNC: 1.1 MG/DL (ref 0.8–1.5)
DIFFERENTIAL METHOD BLD: ABNORMAL
EOSINOPHIL # BLD: 0.5 K/UL (ref 0–0.8)
EOSINOPHIL NFR BLD: 2 % (ref 0.5–7.8)
ERYTHROCYTE [DISTWIDTH] IN BLOOD BY AUTOMATED COUNT: 13.5 % (ref 11.9–14.6)
GLUCOSE SERPL-MCNC: 105 MG/DL (ref 65–100)
HCT VFR BLD AUTO: 37.5 % (ref 41.1–50.3)
HGB BLD-MCNC: 12 G/DL (ref 13.6–17.2)
IMM GRANULOCYTES # BLD AUTO: 0.2 K/UL (ref 0–0.5)
IMM GRANULOCYTES NFR BLD AUTO: 1 % (ref 0–5)
LYMPHOCYTES # BLD: 2.1 K/UL (ref 0.5–4.6)
LYMPHOCYTES NFR BLD: 11 % (ref 13–44)
MCH RBC QN AUTO: 29.9 PG (ref 26.1–32.9)
MCHC RBC AUTO-ENTMCNC: 32 G/DL (ref 31.4–35)
MCV RBC AUTO: 93.3 FL (ref 79.6–97.8)
MM INDURATION, POC: 0 MM (ref 0–5)
MONOCYTES # BLD: 2 K/UL (ref 0.1–1.3)
MONOCYTES NFR BLD: 11 % (ref 4–12)
NEUTS SEG # BLD: 14 K/UL (ref 1.7–8.2)
NEUTS SEG NFR BLD: 74 % (ref 43–78)
NRBC # BLD: 0 K/UL (ref 0–0.2)
PLATELET # BLD AUTO: 335 K/UL (ref 150–450)
PMV BLD AUTO: 9.9 FL (ref 9.4–12.3)
POTASSIUM SERPL-SCNC: 3.7 MMOL/L (ref 3.5–5.1)
PPD, POC: NEGATIVE
RBC # BLD AUTO: 4.02 M/UL (ref 4.23–5.6)
SODIUM SERPL-SCNC: 135 MMOL/L (ref 138–145)
VANCOMYCIN SERPL-MCNC: 22.9 UG/ML
WBC # BLD AUTO: 18.8 K/UL (ref 4.3–11.1)

## 2022-09-26 PROCEDURE — 6370000000 HC RX 637 (ALT 250 FOR IP): Performed by: INTERNAL MEDICINE

## 2022-09-26 PROCEDURE — 6370000000 HC RX 637 (ALT 250 FOR IP): Performed by: FAMILY MEDICINE

## 2022-09-26 PROCEDURE — 2580000003 HC RX 258: Performed by: SURGERY

## 2022-09-26 PROCEDURE — 6360000002 HC RX W HCPCS: Performed by: INTERNAL MEDICINE

## 2022-09-26 PROCEDURE — 1100000000 HC RM PRIVATE

## 2022-09-26 PROCEDURE — 36415 COLL VENOUS BLD VENIPUNCTURE: CPT

## 2022-09-26 PROCEDURE — 2580000003 HC RX 258: Performed by: FAMILY MEDICINE

## 2022-09-26 PROCEDURE — 6360000002 HC RX W HCPCS: Performed by: SURGERY

## 2022-09-26 PROCEDURE — 6360000002 HC RX W HCPCS: Performed by: FAMILY MEDICINE

## 2022-09-26 PROCEDURE — 2580000003 HC RX 258: Performed by: INTERNAL MEDICINE

## 2022-09-26 PROCEDURE — 80202 ASSAY OF VANCOMYCIN: CPT

## 2022-09-26 PROCEDURE — 85025 COMPLETE CBC W/AUTO DIFF WBC: CPT

## 2022-09-26 PROCEDURE — 2700000000 HC OXYGEN THERAPY PER DAY

## 2022-09-26 PROCEDURE — 80048 BASIC METABOLIC PNL TOTAL CA: CPT

## 2022-09-26 RX ORDER — CARVEDILOL 12.5 MG/1
12.5 TABLET ORAL 2 TIMES DAILY WITH MEALS
Status: DISCONTINUED | OUTPATIENT
Start: 2022-09-26 | End: 2022-09-29 | Stop reason: HOSPADM

## 2022-09-26 RX ORDER — MORPHINE SULFATE 2 MG/ML
2 INJECTION, SOLUTION INTRAMUSCULAR; INTRAVENOUS EVERY 4 HOURS PRN
Status: DISCONTINUED | OUTPATIENT
Start: 2022-09-26 | End: 2022-09-29 | Stop reason: HOSPADM

## 2022-09-26 RX ORDER — HYDRALAZINE HYDROCHLORIDE 20 MG/ML
10 INJECTION INTRAMUSCULAR; INTRAVENOUS EVERY 6 HOURS PRN
Status: DISCONTINUED | OUTPATIENT
Start: 2022-09-26 | End: 2022-09-29 | Stop reason: HOSPADM

## 2022-09-26 RX ADMIN — LOSARTAN POTASSIUM 50 MG: 50 TABLET, FILM COATED ORAL at 08:50

## 2022-09-26 RX ADMIN — MORPHINE SULFATE 2 MG: 2 INJECTION, SOLUTION INTRAMUSCULAR; INTRAVENOUS at 20:21

## 2022-09-26 RX ADMIN — Medication 250 MG: at 20:14

## 2022-09-26 RX ADMIN — HYDROCHLOROTHIAZIDE 25 MG: 25 TABLET ORAL at 08:50

## 2022-09-26 RX ADMIN — ANTI-FUNGAL POWDER MICONAZOLE NITRATE TALC FREE: 1.42 POWDER TOPICAL at 08:50

## 2022-09-26 RX ADMIN — PIPERACILLIN AND TAZOBACTAM 4500 MG: 4; .5 INJECTION, POWDER, LYOPHILIZED, FOR SOLUTION INTRAVENOUS at 05:13

## 2022-09-26 RX ADMIN — CARVEDILOL 12.5 MG: 12.5 TABLET, FILM COATED ORAL at 16:32

## 2022-09-26 RX ADMIN — MORPHINE SULFATE 2 MG: 2 INJECTION, SOLUTION INTRAMUSCULAR; INTRAVENOUS at 09:42

## 2022-09-26 RX ADMIN — SODIUM CHLORIDE, PRESERVATIVE FREE 10 ML: 5 INJECTION INTRAVENOUS at 08:50

## 2022-09-26 RX ADMIN — SODIUM CHLORIDE, PRESERVATIVE FREE 5 ML: 5 INJECTION INTRAVENOUS at 19:40

## 2022-09-26 RX ADMIN — ENOXAPARIN SODIUM 60 MG: 100 INJECTION SUBCUTANEOUS at 20:23

## 2022-09-26 RX ADMIN — SPIRONOLACTONE 50 MG: 25 TABLET ORAL at 08:50

## 2022-09-26 RX ADMIN — ENOXAPARIN SODIUM 60 MG: 100 INJECTION SUBCUTANEOUS at 08:50

## 2022-09-26 RX ADMIN — SODIUM CHLORIDE 1500 MG: 9 INJECTION, SOLUTION INTRAVENOUS at 10:54

## 2022-09-26 RX ADMIN — AMLODIPINE BESYLATE 10 MG: 10 TABLET ORAL at 08:50

## 2022-09-26 RX ADMIN — HYDRALAZINE HYDROCHLORIDE 10 MG: 20 INJECTION INTRAMUSCULAR; INTRAVENOUS at 09:41

## 2022-09-26 RX ADMIN — Medication 250 MG: at 08:50

## 2022-09-26 RX ADMIN — CARVEDILOL 6.25 MG: 6.25 TABLET, FILM COATED ORAL at 08:50

## 2022-09-26 RX ADMIN — HYDROCODONE BITARTRATE AND ACETAMINOPHEN 1 TABLET: 10; 325 TABLET ORAL at 02:08

## 2022-09-26 RX ADMIN — PIPERACILLIN AND TAZOBACTAM 4500 MG: 4; .5 INJECTION, POWDER, LYOPHILIZED, FOR SOLUTION INTRAVENOUS at 13:04

## 2022-09-26 RX ADMIN — PIPERACILLIN AND TAZOBACTAM 4500 MG: 4; .5 INJECTION, POWDER, LYOPHILIZED, FOR SOLUTION INTRAVENOUS at 20:14

## 2022-09-26 RX ADMIN — MORPHINE SULFATE 2 MG: 2 INJECTION, SOLUTION INTRAMUSCULAR; INTRAVENOUS at 14:52

## 2022-09-26 RX ADMIN — VANCOMYCIN HYDROCHLORIDE 1250 MG: 10 INJECTION, POWDER, LYOPHILIZED, FOR SOLUTION INTRAVENOUS at 02:51

## 2022-09-26 RX ADMIN — MORPHINE SULFATE 2 MG: 2 INJECTION, SOLUTION INTRAMUSCULAR; INTRAVENOUS at 06:08

## 2022-09-26 RX ADMIN — ANTI-FUNGAL POWDER MICONAZOLE NITRATE TALC FREE: 1.42 POWDER TOPICAL at 20:14

## 2022-09-26 ASSESSMENT — PAIN DESCRIPTION - DESCRIPTORS
DESCRIPTORS: ACHING
DESCRIPTORS: ACHING
DESCRIPTORS: SHARP
DESCRIPTORS: ACHING;DISCOMFORT
DESCRIPTORS: SHARP

## 2022-09-26 ASSESSMENT — PAIN SCALES - GENERAL
PAINLEVEL_OUTOF10: 4
PAINLEVEL_OUTOF10: 5
PAINLEVEL_OUTOF10: 0
PAINLEVEL_OUTOF10: 7
PAINLEVEL_OUTOF10: 0
PAINLEVEL_OUTOF10: 0
PAINLEVEL_OUTOF10: 8
PAINLEVEL_OUTOF10: 5
PAINLEVEL_OUTOF10: 8
PAINLEVEL_OUTOF10: 7
PAINLEVEL_OUTOF10: 10
PAINLEVEL_OUTOF10: 10
PAINLEVEL_OUTOF10: 0

## 2022-09-26 ASSESSMENT — PAIN - FUNCTIONAL ASSESSMENT
PAIN_FUNCTIONAL_ASSESSMENT: PREVENTS OR INTERFERES WITH MANY ACTIVE NOT PASSIVE ACTIVITIES

## 2022-09-26 ASSESSMENT — PAIN DESCRIPTION - LOCATION
LOCATION: BREAST
LOCATION: ABDOMEN
LOCATION: BREAST

## 2022-09-26 ASSESSMENT — PAIN DESCRIPTION - ORIENTATION
ORIENTATION: RIGHT

## 2022-09-26 ASSESSMENT — PAIN DESCRIPTION - PAIN TYPE
TYPE: SURGICAL PAIN

## 2022-09-26 NOTE — PROGRESS NOTES
VANCO DAILY FOLLOW UP NOTE  [facility] [facility] Pharmacokinetic Monitoring Service - Vancomycin    Consulting Provider: Atilio Lewis   Indication: SSTI  Target Concentration: Goal trough of 10-15 mg/L and AUC/CARLO <500 mg*hr/L  Day of Therapy: 4 of 5  Additional Antimicrobials: pip/tazo    Pertinent Laboratory Values: Wt Readings from Last 1 Encounters:   09/26/22 (!) 486 lb 4.8 oz (220.6 kg)     Temp Readings from Last 1 Encounters:   09/26/22 98.4 °F (36.9 °C) (Oral)     Recent Labs     09/24/22  0400 09/24/22  0832 09/25/22  1554 09/26/22  0505   BUN 11  --  12 12   CREATININE 1.20  --  1.30 1.10   WBC 21.3*  --  17.4* 18.8*   LACACIDPL  --  1.0  --   --      Estimated Creatinine Clearance: 160 mL/min (based on SCr of 1.1 mg/dL). Lab Results   Component Value Date/Time    VANCORANDOM 22.9 09/26/2022 05:05 AM       MRSA Nasal Swab: N/A. Non-respiratory infection. Assessment:  Date/Time Dose Concentration AUC   9/24 0832 1250 mg q12h 9.3 276   9/26 0505 1250 mg q 8h 22.9 385   Note: Serum concentrations collected for AUC dosing may appear elevated if collected in close proximity to the dose administered, this is not necessarily an indication of toxicity    Plan:  Current dosing regimen is sub-therapeutic  Increase dose to 1500 mg q 8h for predicted AUC/Tr of 462/8  Repeat vancomycin concentrations as clinically indicated.    Pharmacy will continue to monitor patient and adjust therapy as indicated    Thank you for the consult,  Lilly Zhou, [address] [facility]

## 2022-09-26 NOTE — PROGRESS NOTES
Hospitalist Progress Note   Admit Date:  2022 10:00 PM   Name:  Ced Officer   Age:  37 y.o. Sex:  male  :  1980   MRN:  [id number]   Room:  [facility]/    Presenting Complaint: Abscess     Reason(s) for Admission: Septicemia [facility]) [A41.9]  Abdominal wall cellulitis [A39.909]  Sepsis [facility]) [A41.9]     Hospital Course:   37 y.o. male with medical history of Morbid obesity, previous cellulitis, depression and HTN who presented with fever and right upper extremity pain. Patient states that on 22 he started to notice cysts with pain appearing in his right axilla with the pain radiating down the right side toward his breast.  He was evaluated at [facility]OS[facility] [address] and was found to have cellulitis. He was given keflex and bactrim for treatment. While at home however he spiked a fever and the pain intensified despite taking the antibiotics. Patient was admitted for sepsis secondary to cellulitis of right axilla. CT chest abdomen and pelvis shows changes suggestive of cellulitis in the R axilla and adjacent chest wall; no acute intra-abdominal or intrathoracic process. Pt was started on Vanc/Zosyn on admission. Blood cultures obtained, though after abx initiated so far no growth. Large area of induration, general surgery consulted and performed I&D . Operative cultures pending    O2 sat hospital course was 85% and pt placed on 4LO2NC. ? OHS/SAROJ with severe morbid obesity BMI 77. Sleep studies in 10/2021 appeared to be consistent with severe SAROJ/OHS. Lost to follow up. Rapid Covid-19 negative. Per [facility] physician Dr. Satish Downs:  [facility] obstructive sleep apnea with an AHI of  67.8 events/hr, arousal index   of 0 per hour and a low saturation of 50%. Obstructive sleep apnea best treated on Bipap at 24 / 20 cmH2O, with a   large-sized, F-20, AIr-Fit, Resmed, Full Face Mask. There were no PLMS with an index of  0.0/hr and PLMS arousal index of 0 /hr.   CPAP was not sufficient in treating the patient's sleep disordered breathing. \"      Subjective & 24hr Events (09/26/22): Patient alert and oriented x3. Sitting up in recliner. Afebrile. Feeling better. On 3LO2NC. Awaiting operative cultures    No fever, chills, SOB, chest pain, abdominal pain, nausea, vomiting    Assessment & Plan:     Sepsis   Cellulitis of right axilla  Met criteria with T102.2, HR>100 with source. Failed outpatient therapy with Keflex and Bactrim. CT chest abdomen and pelvis shows changes suggestive of cellulitis in the R axilla and adjacent chest wall; no acute intra-abdominal or intrathoracic process  No BCX obtained on admission  Abx: S/p Vanc/ Cont Zosyn (9/23-. ..)  BCX obtained, though after abx initiated: NGTD  MRSA negative  Consulted wound care  General surgery consulted, appreciate recs. Surgery performed I&D 9/25 on right chest wall. Operative cultures---pending  MRSA negative, D/c Vanc    Acute respiratory failure with hypoxia  Morbid obesity with BMI of 70  SAROJ/OHS  O2 sat 85% on RA and placed on 4LO2NC. CT chest on 9/23 was unremarkable overall  Sleep studies in 10/2021 appeared to be consistent with severe SAROJ/OHS. Lost to follow up. Rapid Covid-19 negative  Per [facility] Dr. Yu Flaturbano:  \"Severe obstructive sleep apnea with an AHI of  67.8 events/hr, arousal index   of 0 per hour and a low saturation of 50%. Obstructive sleep apnea best treated on Bipap at 24 / 20 cmH2O, with a   large-sized, F-20, AIr-Fit, Resmed, Full Face Mask. There were no PLMS with an index of  0.0/hr and PLMS arousal index of 0 /hr. CPAP was not sufficient in treating the patient's sleep disordered breathing. \"  Discussed with CM about home BiPAP to arrange for patient--complicated as pt uninsured. On 3LO2 this AM, wean as tolerated  RT for walking pulse ox prior to discharge.      Hypokalemia, resolved  Replace and recheck    Essential hypertension  Continue home Norvasc, hydrochlorothiazide, losartan, spironolactone  Increase Coreg dose      Anticipated discharge needs:      1-2 days pending wound Cx result to de-escalate abx. PT/OT--no further skilled therapy needed    I spent 37 minutes of time caring for this patient on the unit nearby or at bedside, and more than 50 percent was spent on coordination of care activities, and/or patient/family counseling regarding status and plan of care. Diet:  ADULT DIET; Regular; Low Fat/Low Chol/High Fiber/2 gm Na  DVT PPx: Lovenox SQ  Code status: Full Code    Hospital Problems:  Principal Problem:    Sepsis ([address]  Active Problems:    Depression    Essential hypertension    Morbid obesity with BMI of 70 and over, adult ([facility] [address]    Cellulitis of right axilla  Resolved Problems:    * No resolved hospital problems.  *      Objective:   Patient Vitals for the past 24 hrs:   Temp Pulse Resp BP SpO2   09/26/22 1100 98.5 °F (36.9 °C) 70 18 (!) 167/94 97 %   09/26/22 0741 -- 78 20 (!) 186/99 --   09/26/22 0733 98.6 °F (37 °C) 77 20 (!) 190/104 98 %   09/26/22 0405 98.4 °F (36.9 °C) 76 18 (!) 171/95 92 %   09/26/22 0029 99 °F (37.2 °C) 83 18 (!) 173/97 92 %   09/25/22 2010 98.2 °F (36.8 °C) 78 18 (!) 166/94 97 %   09/25/22 1640 98.4 °F (36.9 °C) 64 18 (!) 158/96 100 %   09/25/22 1411 -- 81 18 133/66 100 %   09/25/22 1406 -- 83 18 (!) 145/74 99 %   09/25/22 1401 -- 84 20 (!) 151/78 97 %   09/25/22 1356 -- 81 18 (!) 144/67 100 %   09/25/22 1351 -- 84 18 (!) 155/69 100 %   09/25/22 1346 -- 82 20 (!) 142/65 96 %   09/25/22 1341 -- 83 18 (!) 143/65 99 %   09/25/22 1336 -- 82 20 (!) 150/69 96 %   09/25/22 1331 98.2 °F (36.8 °C) 82 20 (!) 146/69 95 %   09/25/22 1326 -- 84 18 135/69 98 %   09/25/22 1321 -- 82 20 (!) 146/73 98 %   09/25/22 1316 -- 80 18 132/70 98 %   09/25/22 1311 -- 83 18 (!) 143/65 98 %   09/25/22 1308 -- 82 20 (!) 145/64 96 %   09/25/22 1303 -- 85 20 (!) 144/63 98 %   09/25/22 1301 97.3 °F (36.3 °C) 86 22 (!) 155/84 97 %   09/25/22 1258 97.3 °F (36.3 °C) 86 18 (!) 155/84 98 %       Oxygen Therapy  SpO2: 97 %  Pulse Oximetry Type: Intermittent  Pulse Oximeter Device Mode: Continuous  Pulse Oximeter Device Location: Finger, Left  O2 Device: None (Room air)  O2 Flow Rate (L/min): 3 L/min    Estimated body mass index is 78.49 kg/m² as calculated from the following:    Height as of this encounter: 5' 6\" (1.676 m). Weight as of this encounter: 486 lb 4.8 oz (220.6 kg). Intake/Output Summary (Last 24 hours) at 9/26/2022 1248  Last data filed at 9/26/2022 0829  Gross per 24 hour   Intake 340 ml   Output 950 ml   Net -610 ml         Physical Exam:     Blood pressure (!) 167/94, pulse 70, temperature 98.5 °F (36.9 °C), temperature source Oral, resp. rate 18, height 5' 6\" (1.676 m), weight (!) 486 lb 4.8 oz (220.6 kg), SpO2 97 %. General:    Well nourished. Morbidly obese  Head:  Normocephalic, atraumatic  Eyes:  Sclerae appear normal.  Pupils equally round. ENT:  Nares appear normal, no drainage. Moist oral mucosa  Neck:  No restricted ROM. Trachea midline   CV:   RRR. No m/r/g. No jugular venous distension. Lungs:   CTAB. No wheezing, rhonchi, or rales. Symmetric expansion. Abdomen: Bowel sounds present. Soft, nontender, nondistended. Extremities: No cyanosis or clubbing. No edema  Skin:     R axilla extending to under R breast/ skin fold on RUQ with erythema and weeping induration, TTP. Neuro:  CN II-XII grossly intact. Sensation intact. A&Ox3  Psych:  Normal mood and affect. I have personally reviewed labs and tests showing:  Recent Labs:  Recent Results (from the past 48 hour(s))   MSSA/MRSA Screen BY PCR    Collection Time: 09/24/22  3:54 PM    Specimen: Nares; Swab   Result Value Ref Range    Special Requests NO SPECIAL REQUESTS      Culture (A)       MRSA target DNA not detected, SA target DNA detected. A MRSA negative, SA positive test result does not preclude MRSA nasal colonization.    COVID-19, Rapid    Collection Time: 09/24/22  3:54 PM    Specimen: Nasopharyngeal Result Value Ref Range    Source Nasopharyngeal      SARS-CoV-2, Rapid Not detected NOTD     Culture, Wound Aerobic Only    Collection Time: 09/25/22  1:01 PM    Specimen: Breast   Result Value Ref Range    Special Requests RIGHT  BREAST  ABSCESS        Gram stain 0 TO 15 WBCS SEEN /OIF     Gram stain FEW GRAM NEGATIVE RODS      Culture NO GROWTH 1 DAY     PLEASE READ & DOCUMENT PPD TEST IN 48 HRS    Collection Time: 09/25/22  2:43 PM   Result Value Ref Range    PPD, (POC) Negative Negative    mm Induration 0 0 - 5 mm   CBC with Auto Differential    Collection Time: 09/25/22  3:54 PM   Result Value Ref Range    WBC 17.4 (H) 4.3 - 11.1 K/uL    RBC 4.11 (L) 4.23 - 5.6 M/uL    Hemoglobin 12.2 (L) 13.6 - 17.2 g/dL    Hematocrit 38.5 (L) 41.1 - 50.3 %    MCV 93.7 79.6 - 97.8 FL    MCH 29.7 26.1 - 32.9 PG    MCHC 31.7 31.4 - 35.0 g/dL    RDW 13.7 11.9 - 14.6 %    Platelets 576 374 - 136 K/uL    MPV 9.6 9.4 - 12.3 FL    nRBC 0.00 0.0 - 0.2 K/uL    Differential Type AUTOMATED      Seg Neutrophils 77 43 - 78 %    Lymphocytes 9 (L) 13 - 44 %    Monocytes 11 4.0 - 12.0 %    Eosinophils % 2 0.5 - 7.8 %    Basophils 0 0.0 - 2.0 %    Immature Granulocytes 1 0.0 - 5.0 %    Segs Absolute 13.4 (H) 1.7 - 8.2 K/UL    Absolute Lymph # 1.6 0.5 - 4.6 K/UL    Absolute Mono # 1.9 (H) 0.1 - 1.3 K/UL    Absolute Eos # 0.3 0.0 - 0.8 K/UL    Basophils Absolute 0.1 0.0 - 0.2 K/UL    Absolute Immature Granulocyte 0.1 0.0 - 0.5 K/UL   Basic Metabolic Panel    Collection Time: 09/25/22  3:54 PM   Result Value Ref Range    Sodium 135 (L) 138 - 145 mmol/L    Potassium 3.7 3.5 - 5.1 mmol/L    Chloride 96 (L) 101 - 110 mmol/L    CO2 36 (H) 21 - 32 mmol/L    Anion Gap 3 (L) 4 - 13 mmol/L    Glucose 110 (H) 65 - 100 mg/dL    BUN 12 6 - 23 MG/DL    Creatinine 1.30 0.8 - 1.5 MG/DL    GFR African American >60 >60 ml/min/1.73m2    GFR Non- >60 >60 ml/min/1.73m2    Calcium 9.3 8.3 - 10.4 MG/DL   Vancomycin Level, Random    Collection Time: 09/26/22  5:05 AM   Result Value Ref Range    Vancomycin Rm 22.9 UG/ML   CBC with Auto Differential    Collection Time: 09/26/22  5:05 AM   Result Value Ref Range    WBC 18.8 (H) 4.3 - 11.1 K/uL    RBC 4.02 (L) 4.23 - 5.6 M/uL    Hemoglobin 12.0 (L) 13.6 - 17.2 g/dL    Hematocrit 37.5 (L) 41.1 - 50.3 %    MCV 93.3 79.6 - 97.8 FL    MCH 29.9 26.1 - 32.9 PG    MCHC 32.0 31.4 - 35.0 g/dL    RDW 13.5 11.9 - 14.6 %    Platelets 394 281 - 139 K/uL    MPV 9.9 9.4 - 12.3 FL    nRBC 0.00 0.0 - 0.2 K/uL    Differential Type AUTOMATED      Seg Neutrophils 74 43 - 78 %    Lymphocytes 11 (L) 13 - 44 %    Monocytes 11 4.0 - 12.0 %    Eosinophils % 2 0.5 - 7.8 %    Basophils 0 0.0 - 2.0 %    Immature Granulocytes 1 0.0 - 5.0 %    Segs Absolute 14.0 (H) 1.7 - 8.2 K/UL    Absolute Lymph # 2.1 0.5 - 4.6 K/UL    Absolute Mono # 2.0 (H) 0.1 - 1.3 K/UL    Absolute Eos # 0.5 0.0 - 0.8 K/UL    Basophils Absolute 0.1 0.0 - 0.2 K/UL    Absolute Immature Granulocyte 0.2 0.0 - 0.5 K/UL   Basic Metabolic Panel    Collection Time: 09/26/22  5:05 AM   Result Value Ref Range    Sodium 135 (L) 138 - 145 mmol/L    Potassium 3.7 3.5 - 5.1 mmol/L    Chloride 95 (L) 101 - 110 mmol/L    CO2 38 (H) 21 - 32 mmol/L    Anion Gap 2 (L) 4 - 13 mmol/L    Glucose 105 (H) 65 - 100 mg/dL    BUN 12 6 - 23 MG/DL    Creatinine 1.10 0.8 - 1.5 MG/DL    GFR African American >60 >60 ml/min/1.73m2    GFR Non- >60 >60 ml/min/1.73m2    Calcium 9.4 8.3 - 10.4 MG/DL       I have personally reviewed imaging studies showing: Other Studies:  CT CHEST ABDOMEN PELVIS W CONTRAST   Final Result   1. Changes suggestive of cellulitis in the right axilla and adjacent chest wall. 2. No acute intra-abdominal or intrathoracic process. XR CHEST PORTABLE   Final Result   No acute process.           Current Meds:  Current Facility-Administered Medications   Medication Dose Route Frequency    hydrALAZINE (APRESOLINE) injection 10 mg  10 mg IntraVENous Q6H PRN vancomycin (VANCOCIN) 1500 mg in sodium chloride 0.9% 500 mL IVPB  1,500 mg IntraVENous Q8H    carvedilol (COREG) tablet 6.25 mg  6.25 mg Oral BID WC    saccharomyces boulardii (FLORASTOR) capsule 250 mg  250 mg Oral BID    morphine injection 2 mg  2 mg IntraVENous Q3H PRN    miconazole (MICOTIN) 2 % powder   Topical BID    losartan (COZAAR) tablet 50 mg  50 mg Oral Daily    amLODIPine (NORVASC) tablet 10 mg  10 mg Oral Daily    hydroCHLOROthiazide (HYDRODIURIL) tablet 25 mg  25 mg Oral Daily    sodium chloride flush 0.9 % injection 5-40 mL  5-40 mL IntraVENous 2 times per day    sodium chloride flush 0.9 % injection 5-40 mL  5-40 mL IntraVENous PRN    0.9 % sodium chloride infusion   IntraVENous PRN    enoxaparin (LOVENOX) injection 60 mg  60 mg SubCUTAneous BID    ondansetron (ZOFRAN-ODT) disintegrating tablet 4 mg  4 mg Oral Q8H PRN    Or    ondansetron (ZOFRAN) injection 4 mg  4 mg IntraVENous Q6H PRN    polyethylene glycol (GLYCOLAX) packet 17 g  17 g Oral Daily PRN    acetaminophen (TYLENOL) tablet 650 mg  650 mg Oral Q6H PRN    Or    acetaminophen (TYLENOL) suppository 650 mg  650 mg Rectal Q6H PRN    HYDROcodone-acetaminophen (NORCO)  MG per tablet 1 tablet  1 tablet Oral Q6H PRN    spironolactone (ALDACTONE) tablet 50 mg  50 mg Oral Daily    piperacillin-tazobactam (ZOSYN) 4,500 mg in sodium chloride 0.9 % 100 mL IVPB (mini-bag)  4,500 mg IntraVENous Q8H       Signed: Bakari Iglesias DO    Part of this note may have been written by using a voice dictation software. The note has been proof read but may still contain some grammatical/other typographical errors.

## 2022-09-26 NOTE — PROGRESS NOTES
Pt had I/D yesterday of R axilla/pectoralis fold. Pt discussed during IDR. He would greatly benefit from a BiPap machine, however, pt is self pay. CM contacted PM and [facility] and both are unable to provide a loaner due to the availability of the machine. Even if the [employer] company had a loaner, the pts mother (who was at bedside) stated that he has limited funds to pay. She mentioned that he recently bought a new car and has not been at his job long. Pt likely will need home oxygen. Pt was unable to keep his eyes open when this writer was trying to talk to him about affording oxygen. Will revisit when he is more awake.

## 2022-09-26 NOTE — PLAN OF CARE
Problem: Pain  Goal: Verbalizes/displays adequate comfort level or baseline comfort level  Outcome: Not Progressing     Problem: Discharge Planning  Goal: Discharge to home or other facility with appropriate resources  Outcome: Progressing     Problem: Safety - Adult  Goal: Free from fall injury  Outcome: Progressing     Problem: ABCDS Injury Assessment  Goal: Absence of physical injury  Outcome: Progressing     Problem: Pain  Goal: Verbalizes/displays adequate comfort level or baseline comfort level  Outcome: Not Progressing

## 2022-09-26 NOTE — PROGRESS NOTES
Pt's Bps were starting to get high, 173/97. Contacted Dr. Dexter Marques and orders were placed to give hydralazine every 6 hrs if systolic is above 532.

## 2022-09-26 NOTE — PROGRESS NOTES
Physician Progress Note      PATIENT:               Karrie Gaston  CSN #:                  [id number]  :                       1980  ADMIT DATE:       2022 10:00 PM  [address] [place] DATE:  RESPONDING  PROVIDER #:        Arcelia Zavala DO          QUERY TEXT:    Pt admitted with sepsis. Pt noted to have acute organ dysfunction of acute   respiratory failure. If possible, please document in progress notes and   discharge summary the relationship, if any, between Sepsis and acute   respiratory failure. The medical record reflects the following:  Risk Factors: 37 y.o. male with medical history of Morbid obesity, previous   cellulitis, depression and HTN who presented with fever and right upper   extremity pain. Patient states that on 22 he started to notice cysts   with pain appearing in his right axilla with the pain radiating down the right   side toward his breast.  Clinical Indicators: sepsis and acute respiratory failure  Treatment: 4LNC, rapid covid swab    Thank you,  Loretha Kawasaki RN, BSN, CDI  [email or web address]  . Options provided:  -- Acute organ dysfunction of  acute respiratory failure is associated with   sepsis  -- Acute organ dysfunction of acute respiratory failure is unrelated to sepsis  -- Other - I will add my own diagnosis  -- Disagree - Not applicable / Not valid  -- Disagree - Clinically unable to determine / Unknown  -- Refer to Clinical Documentation Reviewer    PROVIDER RESPONSE TEXT:    This patient has acute organ dysfunction of acute respiratory failure   unrelated to sepsis. Query created by: Caleb Lund on 2022 3:25 PM      Electronically signed by:   Loren Thornton DO 2022 4:19 PM

## 2022-09-26 NOTE — PROGRESS NOTES
Admit Date: 2022    POD 1 Day Post-Op    Procedure:  Procedure(s):  RIGHT AXILLARY IRRIGATION AND DEBRIDEMENT    Subjective: The patient is sitting comfortably in chair. He states his pain is controlled. He denies any nausea or emesis. He has no complaints. Objective:       Vitals:    22 0029 22 0405 22 0733 22 0741   BP: (!) 173/97 (!) 171/95 (!) 190/104 (!) 186/99   Pulse: 83 76 77 78   Resp: 18 18     Temp: 99 °F (37.2 °C) 98.4 °F (36.9 °C)     TempSrc: Oral Oral     SpO2: 92% 92% 98%    Weight:  (!) 486 lb 4.8 oz (220.6 kg)     Height:           Temp (24hrs), Av °F (36.7 °C), Min:97.2 °F (36.2 °C), Max:99 °F (37.2 °C)  . I&O reviewed as documented. Constitutional: Alert, oriented, cooperative patient in no acute distress; appears stated age    Eyes:Sclera are clear. EOMs intact  ENMT: no external lesions gross hearing normal; no obvious neck masses, no ear or lip lesions, nares normal  CV: RRR. Normal perfusion  Resp: No JVD. Breathing is  non-labored; no audible wheezing. GI: soft and non-distended     Musculoskeletal: unremarkable with normal function. No embolic signs or cyanosis. Neuro:  Oriented; moves all 4; no focal deficits  Psychiatric: normal affect and mood, no memory impairment  Skin: Right chest wall with packing in place, no evidence of any active drainage.   No surrounding erythema    Labs:   Recent Results (from the past 24 hour(s))   PLEASE READ & DOCUMENT PPD TEST IN 48 HRS    Collection Time: 22  2:43 PM   Result Value Ref Range    PPD, (POC) Negative Negative    mm Induration 0 0 - 5 mm   CBC with Auto Differential    Collection Time: 22  3:54 PM   Result Value Ref Range    WBC 17.4 (H) 4.3 - 11.1 K/uL    RBC 4.11 (L) 4.23 - 5.6 M/uL    Hemoglobin 12.2 (L) 13.6 - 17.2 g/dL    Hematocrit 38.5 (L) 41.1 - 50.3 %    MCV 93.7 79.6 - 97.8 FL    MCH 29.7 26.1 - 32.9 PG    MCHC 31.7 31.4 - 35.0 g/dL    RDW 13.7 11.9 - 14.6 % Platelets 086 441 - 228 K/uL    MPV 9.6 9.4 - 12.3 FL    nRBC 0.00 0.0 - 0.2 K/uL    Differential Type AUTOMATED      Seg Neutrophils 77 43 - 78 %    Lymphocytes 9 (L) 13 - 44 %    Monocytes 11 4.0 - 12.0 %    Eosinophils % 2 0.5 - 7.8 %    Basophils 0 0.0 - 2.0 %    Immature Granulocytes 1 0.0 - 5.0 %    Segs Absolute 13.4 (H) 1.7 - 8.2 K/UL    Absolute Lymph # 1.6 0.5 - 4.6 K/UL    Absolute Mono # 1.9 (H) 0.1 - 1.3 K/UL    Absolute Eos # 0.3 0.0 - 0.8 K/UL    Basophils Absolute 0.1 0.0 - 0.2 K/UL    Absolute Immature Granulocyte 0.1 0.0 - 0.5 K/UL   Basic Metabolic Panel    Collection Time: 09/25/22  3:54 PM   Result Value Ref Range    Sodium 135 (L) 138 - 145 mmol/L    Potassium 3.7 3.5 - 5.1 mmol/L    Chloride 96 (L) 101 - 110 mmol/L    CO2 36 (H) 21 - 32 mmol/L    Anion Gap 3 (L) 4 - 13 mmol/L    Glucose 110 (H) 65 - 100 mg/dL    BUN 12 6 - 23 MG/DL    Creatinine 1.30 0.8 - 1.5 MG/DL    GFR African American >60 >60 ml/min/1.73m2    GFR Non- >60 >60 ml/min/1.73m2    Calcium 9.3 8.3 - 10.4 MG/DL   Vancomycin Level, Random    Collection Time: 09/26/22  5:05 AM   Result Value Ref Range    Vancomycin Rm 22.9 UG/ML   CBC with Auto Differential    Collection Time: 09/26/22  5:05 AM   Result Value Ref Range    WBC 18.8 (H) 4.3 - 11.1 K/uL    RBC 4.02 (L) 4.23 - 5.6 M/uL    Hemoglobin 12.0 (L) 13.6 - 17.2 g/dL    Hematocrit 37.5 (L) 41.1 - 50.3 %    MCV 93.3 79.6 - 97.8 FL    MCH 29.9 26.1 - 32.9 PG    MCHC 32.0 31.4 - 35.0 g/dL    RDW 13.5 11.9 - 14.6 %    Platelets 808 297 - 894 K/uL    MPV 9.9 9.4 - 12.3 FL    nRBC 0.00 0.0 - 0.2 K/uL    Differential Type AUTOMATED      Seg Neutrophils 74 43 - 78 %    Lymphocytes 11 (L) 13 - 44 %    Monocytes 11 4.0 - 12.0 %    Eosinophils % 2 0.5 - 7.8 %    Basophils 0 0.0 - 2.0 %    Immature Granulocytes 1 0.0 - 5.0 %    Segs Absolute 14.0 (H) 1.7 - 8.2 K/UL    Absolute Lymph # 2.1 0.5 - 4.6 K/UL    Absolute Mono # 2.0 (H) 0.1 - 1.3 K/UL    Absolute Eos # 0.5 0.0 - 0.8 K/UL    Basophils Absolute 0.1 0.0 - 0.2 K/UL    Absolute Immature Granulocyte 0.2 0.0 - 0.5 K/UL   Basic Metabolic Panel    Collection Time: 09/26/22  5:05 AM   Result Value Ref Range    Sodium 135 (L) 138 - 145 mmol/L    Potassium 3.7 3.5 - 5.1 mmol/L    Chloride 95 (L) 101 - 110 mmol/L    CO2 38 (H) 21 - 32 mmol/L    Anion Gap 2 (L) 4 - 13 mmol/L    Glucose 105 (H) 65 - 100 mg/dL    BUN 12 6 - 23 MG/DL    Creatinine 1.10 0.8 - 1.5 MG/DL    GFR African American >60 >60 ml/min/1.73m2    GFR Non- >60 >60 ml/min/1.73m2    Calcium 9.4 8.3 - 10.4 MG/DL           Assessment:     Principal Problem:    Sepsis (HCC)  Active Problems:    Depression    Essential hypertension    Morbid obesity with BMI of 70 and over, adult ([facility] [address]    Cellulitis of right axilla  Resolved Problems:    * No resolved hospital problems.  *        Plan/Recommendations/Medical Decision Making:     Continue dressing changes to right chest wall wound  Wound care has been consulted  Continue antibiotics  Regular diet  Case management per primary care team    Shahrzad Ardon PA-C

## 2022-09-27 LAB
ANION GAP SERPL CALC-SCNC: 2 MMOL/L (ref 4–13)
BACTERIA SPEC CULT: NORMAL
BASOPHILS # BLD: 0.1 K/UL (ref 0–0.2)
BASOPHILS NFR BLD: 0 % (ref 0–2)
BUN SERPL-MCNC: 14 MG/DL (ref 6–23)
CALCIUM SERPL-MCNC: 9.4 MG/DL (ref 8.3–10.4)
CHLORIDE SERPL-SCNC: 96 MMOL/L (ref 101–110)
CO2 SERPL-SCNC: 40 MMOL/L (ref 21–32)
CREAT SERPL-MCNC: 1.2 MG/DL (ref 0.8–1.5)
DIFFERENTIAL METHOD BLD: ABNORMAL
EOSINOPHIL # BLD: 0.4 K/UL (ref 0–0.8)
EOSINOPHIL NFR BLD: 3 % (ref 0.5–7.8)
ERYTHROCYTE [DISTWIDTH] IN BLOOD BY AUTOMATED COUNT: 13.4 % (ref 11.9–14.6)
GLUCOSE SERPL-MCNC: 113 MG/DL (ref 65–100)
HCT VFR BLD AUTO: 40.1 % (ref 41.1–50.3)
HGB BLD-MCNC: 12.4 G/DL (ref 13.6–17.2)
IMM GRANULOCYTES # BLD AUTO: 0.3 K/UL (ref 0–0.5)
IMM GRANULOCYTES NFR BLD AUTO: 2 % (ref 0–5)
LYMPHOCYTES # BLD: 1.9 K/UL (ref 0.5–4.6)
LYMPHOCYTES NFR BLD: 14 % (ref 13–44)
MCH RBC QN AUTO: 29.2 PG (ref 26.1–32.9)
MCHC RBC AUTO-ENTMCNC: 30.9 G/DL (ref 31.4–35)
MCV RBC AUTO: 94.6 FL (ref 79.6–97.8)
MONOCYTES # BLD: 1.4 K/UL (ref 0.1–1.3)
MONOCYTES NFR BLD: 10 % (ref 4–12)
NEUTS SEG # BLD: 9.5 K/UL (ref 1.7–8.2)
NEUTS SEG NFR BLD: 71 % (ref 43–78)
NRBC # BLD: 0 K/UL (ref 0–0.2)
PLATELET # BLD AUTO: 346 K/UL (ref 150–450)
PMV BLD AUTO: 9.7 FL (ref 9.4–12.3)
POTASSIUM SERPL-SCNC: 3.5 MMOL/L (ref 3.5–5.1)
RBC # BLD AUTO: 4.24 M/UL (ref 4.23–5.6)
SERVICE CMNT-IMP: NORMAL
SODIUM SERPL-SCNC: 138 MMOL/L (ref 138–145)
WBC # BLD AUTO: 13.6 K/UL (ref 4.3–11.1)

## 2022-09-27 PROCEDURE — 2580000003 HC RX 258: Performed by: FAMILY MEDICINE

## 2022-09-27 PROCEDURE — 94761 N-INVAS EAR/PLS OXIMETRY MLT: CPT

## 2022-09-27 PROCEDURE — 2580000003 HC RX 258: Performed by: INTERNAL MEDICINE

## 2022-09-27 PROCEDURE — 6370000000 HC RX 637 (ALT 250 FOR IP): Performed by: INTERNAL MEDICINE

## 2022-09-27 PROCEDURE — 85025 COMPLETE CBC W/AUTO DIFF WBC: CPT

## 2022-09-27 PROCEDURE — 1100000000 HC RM PRIVATE

## 2022-09-27 PROCEDURE — 80048 BASIC METABOLIC PNL TOTAL CA: CPT

## 2022-09-27 PROCEDURE — 36415 COLL VENOUS BLD VENIPUNCTURE: CPT

## 2022-09-27 PROCEDURE — 6360000002 HC RX W HCPCS: Performed by: FAMILY MEDICINE

## 2022-09-27 PROCEDURE — 6370000000 HC RX 637 (ALT 250 FOR IP): Performed by: FAMILY MEDICINE

## 2022-09-27 PROCEDURE — 6360000002 HC RX W HCPCS: Performed by: INTERNAL MEDICINE

## 2022-09-27 PROCEDURE — 2700000000 HC OXYGEN THERAPY PER DAY

## 2022-09-27 RX ORDER — DOXYCYCLINE HYCLATE 100 MG/1
100 CAPSULE ORAL EVERY 12 HOURS SCHEDULED
Status: DISCONTINUED | OUTPATIENT
Start: 2022-09-27 | End: 2022-09-29 | Stop reason: HOSPADM

## 2022-09-27 RX ORDER — CEFDINIR 300 MG/1
300 CAPSULE ORAL EVERY 12 HOURS SCHEDULED
Status: DISCONTINUED | OUTPATIENT
Start: 2022-09-27 | End: 2022-09-29 | Stop reason: HOSPADM

## 2022-09-27 RX ADMIN — DOXYCYCLINE HYCLATE 100 MG: 100 CAPSULE ORAL at 21:18

## 2022-09-27 RX ADMIN — ENOXAPARIN SODIUM 60 MG: 100 INJECTION SUBCUTANEOUS at 21:18

## 2022-09-27 RX ADMIN — SODIUM CHLORIDE, PRESERVATIVE FREE 10 ML: 5 INJECTION INTRAVENOUS at 21:18

## 2022-09-27 RX ADMIN — MORPHINE SULFATE 2 MG: 2 INJECTION, SOLUTION INTRAMUSCULAR; INTRAVENOUS at 07:18

## 2022-09-27 RX ADMIN — ACETAMINOPHEN 650 MG: 325 TABLET ORAL at 14:00

## 2022-09-27 RX ADMIN — AMLODIPINE BESYLATE 10 MG: 10 TABLET ORAL at 08:40

## 2022-09-27 RX ADMIN — ENOXAPARIN SODIUM 60 MG: 100 INJECTION SUBCUTANEOUS at 08:40

## 2022-09-27 RX ADMIN — ANTI-FUNGAL POWDER MICONAZOLE NITRATE TALC FREE: 1.42 POWDER TOPICAL at 09:12

## 2022-09-27 RX ADMIN — HYDROCHLOROTHIAZIDE 25 MG: 25 TABLET ORAL at 08:40

## 2022-09-27 RX ADMIN — CEFDINIR 300 MG: 300 CAPSULE ORAL at 21:17

## 2022-09-27 RX ADMIN — SPIRONOLACTONE 50 MG: 25 TABLET ORAL at 08:40

## 2022-09-27 RX ADMIN — PIPERACILLIN AND TAZOBACTAM 4500 MG: 4; .5 INJECTION, POWDER, LYOPHILIZED, FOR SOLUTION INTRAVENOUS at 03:29

## 2022-09-27 RX ADMIN — CARVEDILOL 12.5 MG: 12.5 TABLET, FILM COATED ORAL at 17:31

## 2022-09-27 RX ADMIN — MORPHINE SULFATE 2 MG: 2 INJECTION, SOLUTION INTRAMUSCULAR; INTRAVENOUS at 21:22

## 2022-09-27 RX ADMIN — LOSARTAN POTASSIUM 50 MG: 50 TABLET, FILM COATED ORAL at 08:40

## 2022-09-27 RX ADMIN — HYDROCODONE BITARTRATE AND ACETAMINOPHEN 1 TABLET: 10; 325 TABLET ORAL at 14:38

## 2022-09-27 RX ADMIN — Medication 250 MG: at 21:18

## 2022-09-27 RX ADMIN — CARVEDILOL 12.5 MG: 12.5 TABLET, FILM COATED ORAL at 08:40

## 2022-09-27 RX ADMIN — HYDROCODONE BITARTRATE AND ACETAMINOPHEN 1 TABLET: 10; 325 TABLET ORAL at 08:49

## 2022-09-27 RX ADMIN — HYDROCODONE BITARTRATE AND ACETAMINOPHEN 1 TABLET: 10; 325 TABLET ORAL at 00:01

## 2022-09-27 RX ADMIN — Medication 250 MG: at 08:40

## 2022-09-27 RX ADMIN — PIPERACILLIN AND TAZOBACTAM 4500 MG: 4; .5 INJECTION, POWDER, LYOPHILIZED, FOR SOLUTION INTRAVENOUS at 12:12

## 2022-09-27 RX ADMIN — SODIUM CHLORIDE, PRESERVATIVE FREE 10 ML: 5 INJECTION INTRAVENOUS at 08:40

## 2022-09-27 ASSESSMENT — PAIN DESCRIPTION - LOCATION
LOCATION: CHEST;INCISION
LOCATION: HEAD;INCISION
LOCATION: ABDOMEN
LOCATION: ABDOMEN
LOCATION: HEAD
LOCATION: INCISION;HEAD
LOCATION: CHEST;INCISION
LOCATION: INCISION

## 2022-09-27 ASSESSMENT — PAIN - FUNCTIONAL ASSESSMENT
PAIN_FUNCTIONAL_ASSESSMENT: ACTIVITIES ARE NOT PREVENTED
PAIN_FUNCTIONAL_ASSESSMENT: PREVENTS OR INTERFERES SOME ACTIVE ACTIVITIES AND ADLS
PAIN_FUNCTIONAL_ASSESSMENT: ACTIVITIES ARE NOT PREVENTED
PAIN_FUNCTIONAL_ASSESSMENT: PREVENTS OR INTERFERES SOME ACTIVE ACTIVITIES AND ADLS

## 2022-09-27 ASSESSMENT — PAIN DESCRIPTION - ORIENTATION
ORIENTATION: ANTERIOR
ORIENTATION: RIGHT

## 2022-09-27 ASSESSMENT — PAIN DESCRIPTION - PAIN TYPE: TYPE: ACUTE PAIN

## 2022-09-27 ASSESSMENT — PAIN SCALES - GENERAL
PAINLEVEL_OUTOF10: 0
PAINLEVEL_OUTOF10: 3
PAINLEVEL_OUTOF10: 0
PAINLEVEL_OUTOF10: 7
PAINLEVEL_OUTOF10: 0
PAINLEVEL_OUTOF10: 9
PAINLEVEL_OUTOF10: 0
PAINLEVEL_OUTOF10: 0
PAINLEVEL_OUTOF10: 7
PAINLEVEL_OUTOF10: 8
PAINLEVEL_OUTOF10: 0
PAINLEVEL_OUTOF10: 4
PAINLEVEL_OUTOF10: 0
PAINLEVEL_OUTOF10: 10

## 2022-09-27 ASSESSMENT — PAIN DESCRIPTION - DESCRIPTORS
DESCRIPTORS: ACHING

## 2022-09-27 NOTE — PROGRESS NOTES
Progress Note    Patient: Deepa Myles MRN: [id number]  SSN: [id number]    YOB: 1980  Age: 37 y.o. Sex: male      Admit Date: 9/22/2022    LOS: 4 days     Assessment and Plan:   37 y.o. male with medical history of Morbid obesity, previous cellulitis, depression and HTN who presented with fever and right upper extremity pain    1. Sepsis secondary to right breast cellulitis complicated with abscess  -Status post I&D  -Cultures with MSSA  -Discontinue Zosyn  -Start cefazolin doxycycline  -Symptomatic management    2. Acute hypoxic respiratory failure likely in the setting of SAROJ and OHS  -Oxygen therapy to maintain oxygen saturation more than 92%  -Dr. Jose A Leigh discussed with  about BiPAP to arrange for patient but complicated since patient uninsured  -Ambulatory pulse ox    3. Hypertension  -Continue Norvasc, hydrochlorothiazide, losartan, spironolactone, Coreg    4. Obesity class III  -Increased morbidity and mortality  -Lifestyle modifications    DVT prophylaxis with Lovenox    Subjective:   37 y.o. male with medical history of Morbid obesity, previous cellulitis, depression and HTN who presented with fever and right upper extremity pain. Patient states that on 9/18/22 he started to notice cysts with pain appearing in his right axilla with the pain radiating down the right side toward his breast.  He was evaluated at [place] [facility] and was found to have cellulitis. He was given keflex and bactrim for treatment. While at home however he spiked a fever and the pain intensified despite taking the antibiotics. Patient seen and examined at bedside. This morning the patient with a little tired. Otherwise denies any chest pain, no abdominal pain, no nausea or vomiting.     Objective:     Vitals:    09/27/22 0840 09/27/22 0919 09/27/22 1120 09/27/22 1438   BP: (!) 158/99  (!) 153/98    Pulse: 80  71 70   Resp:  18 20 18   Temp:   98.1 °F (36.7 °C)    TempSrc:   Oral    SpO2:   99% 96%   Weight:       Height:            Intake and Output:  Current Shift: 09/27 0701 - 09/27 1900  In: 150 [P.O.:150]  Out: 700 [Urine:700]  Last three shifts: 09/25 1901 - 09/27 0700  In: 580 [P.O.:580]  Out: 2775 [Urine:2775]    ROS  10 ROS negative except from stated on subjective    Physical Exam:   General: Alert, oriented, NAD  HEENT: NC/AT, EOM are intact  Neck: supple, no JVD  Cardiovascular: RRR, S1, S2, no murmurs  Respiratory: Lungs are clear, no wheezes or rales  Abdomen: Soft, NT, ND  Back: No CVA tenderness, no paraspinal tenderness  Extremities: LE without pedal edema, no erythema  Neuro: A&O, CN are intact, no focal deficits  Skin: no rash or ulcers  Psych: good mood and affect    Lab/Data Review:  I have personally reviewed patients laboratory data showing  Recent Results (from the past 24 hour(s))   CBC with Auto Differential    Collection Time: 09/27/22  5:14 AM   Result Value Ref Range    WBC 13.6 (H) 4.3 - 11.1 K/uL    RBC 4.24 4.23 - 5.6 M/uL    Hemoglobin 12.4 (L) 13.6 - 17.2 g/dL    Hematocrit 40.1 (L) 41.1 - 50.3 %    MCV 94.6 79.6 - 97.8 FL    MCH 29.2 26.1 - 32.9 PG    MCHC 30.9 (L) 31.4 - 35.0 g/dL    RDW 13.4 11.9 - 14.6 %    Platelets 495 873 - 377 K/uL    MPV 9.7 9.4 - 12.3 FL    nRBC 0.00 0.0 - 0.2 K/uL    Differential Type AUTOMATED      Seg Neutrophils 71 43 - 78 %    Lymphocytes 14 13 - 44 %    Monocytes 10 4.0 - 12.0 %    Eosinophils % 3 0.5 - 7.8 %    Basophils 0 0.0 - 2.0 %    Immature Granulocytes 2 0.0 - 5.0 %    Segs Absolute 9.5 (H) 1.7 - 8.2 K/UL    Absolute Lymph # 1.9 0.5 - 4.6 K/UL    Absolute Mono # 1.4 (H) 0.1 - 1.3 K/UL    Absolute Eos # 0.4 0.0 - 0.8 K/UL    Basophils Absolute 0.1 0.0 - 0.2 K/UL    Absolute Immature Granulocyte 0.3 0.0 - 0.5 K/UL   Basic Metabolic Panel    Collection Time: 09/27/22  5:14 AM   Result Value Ref Range    Sodium 138 138 - 145 mmol/L    Potassium 3.5 3.5 - 5.1 mmol/L    Chloride 96 (L) 101 - 110 mmol/L    CO2 40 (H) 21 - 32 mmol/L Anion Gap 2 (L) 4 - 13 mmol/L    Glucose 113 (H) 65 - 100 mg/dL    BUN 14 6 - 23 MG/DL    Creatinine 1.20 0.8 - 1.5 MG/DL    GFR African American >60 >60 ml/min/1.73m2    GFR Non- >60 >60 ml/min/1.73m2    Calcium 9.4 8.3 - 10.4 MG/DL      [unfilled]     Image:  I have personally reviewed patients imaging showing  CT CHEST ABDOMEN PELVIS W CONTRAST   Final Result   1. Changes suggestive of cellulitis in the right axilla and adjacent chest wall. 2. No acute intra-abdominal or intrathoracic process. XR CHEST PORTABLE   Final Result   No acute process.            Current Facility-Administered Medications   Medication Dose Route Frequency Provider Last Rate Last Admin    cefdinir (OMNICEF) capsule 300 mg  300 mg Oral 2 times per day Zohra Tan MD        doxycycline hyclate (VIBRAMYCIN) capsule 100 mg  100 mg Oral 2 times per day Zohra Tan MD        hydrALAZINE (APRESOLINE) injection 10 mg  10 mg IntraVENous Q6H PRN Diana Sebastian MD   10 mg at 09/26/22 0941    carvedilol (COREG) tablet 12.5 mg  12.5 mg Oral BID WC Thu Thornton, DO   12.5 mg at 09/27/22 0840    morphine injection 2 mg  2 mg IntraVENous Q4H PRN Thu Thornton, DO   2 mg at 09/27/22 1940    saccharomyces boulardii (FLORASTOR) capsule 250 mg  250 mg Oral BID Thu Thornton, DO   250 mg at 09/27/22 0840    miconazole (MICOTIN) 2 % powder   Topical BID Thu Thornton, DO   Given at 09/27/22 0912    losartan (COZAAR) tablet 50 mg  50 mg Oral Daily Suleiman Junior MD   50 mg at 09/27/22 0840    amLODIPine (NORVASC) tablet 10 mg  10 mg Oral Daily Suleiman Junior MD   10 mg at 09/27/22 0840    hydroCHLOROthiazide (HYDRODIURIL) tablet 25 mg  25 mg Oral Daily Suleiman Junior MD   25 mg at 09/27/22 0840    sodium chloride flush 0.9 % injection 5-40 mL  5-40 mL IntraVENous 2 times per day Suleiman Junior MD   10 mL at 09/27/22 0840    sodium chloride flush 0.9 % injection 5-40 mL  5-40 mL IntraVENous PRN Suleiman Junior MD        0.9 % sodium chloride infusion   IntraVENous PRN Grazyna Burgess MD        enoxaparin (LOVENOX) injection 60 mg  60 mg SubCUTAneous BID Grazyna Burgess MD   60 mg at 09/27/22 0840    ondansetron (ZOFRAN-ODT) disintegrating tablet 4 mg  4 mg Oral Q8H PRN Grazyna Burgess MD   4 mg at 09/24/22 1837    Or    ondansetron (ZOFRAN) injection 4 mg  4 mg IntraVENous Q6H PRN Grazyna Burgess MD        polyethylene glycol (GLYCOLAX) packet 17 g  17 g Oral Daily PRN Grazyna Burgess MD        acetaminophen (TYLENOL) tablet 650 mg  650 mg Oral Q6H PRN Grazyna Burgess MD   650 mg at 09/27/22 1400    Or    acetaminophen (TYLENOL) suppository 650 mg  650 mg Rectal Q6H PRN Grazyna Burgess MD        HYDROcodone-acetaminophen [facility])  MG per tablet 1 tablet  1 tablet Oral Q6H PRN Grazyna Burgess MD   1 tablet at 09/27/22 1438    spironolactone (ALDACTONE) tablet 50 mg  50 mg Oral Daily Grazyna Burgess MD   50 mg at 09/27/22 [address] problems     Patient Active Problem List   Diagnosis    Depression    Essential hypertension    Morbid obesity with BMI of 70 and over, adult ([address]    Cellulitis of right axilla    Sepsis ([facility] [address]        I have reviewed, updated, and verified this note's content and spent 38 minutes of my 42 minutes visit performing counseling and coordination of care regarding medical management.        Signed By: Daron Carmona MD     September 27, 2022

## 2022-09-27 NOTE — PROGRESS NOTES
Spiritual Care Visit, initial visit. Visited with patient and his mother at bedside. Visit by Ad Humphrey, Staff .  RADHA.Corby., Ant.FLORA., B.A.

## 2022-09-27 NOTE — PROGRESS NOTES
Oxygen Qualifier       Room air: SpO2 with O2 and liter flow   Resting SpO2  96%  98% on 3L   Ambulating SpO2  92%      Pt ambulated on RA and maintained adequate SPO2 during exercise. Moderate SOB noted during ambulation. Completed by:  DENG Cr

## 2022-09-27 NOTE — PLAN OF CARE
Problem: Discharge Planning  Goal: Discharge to home or other facility with appropriate resources  Outcome: Progressing  Flowsheets  Taken 9/27/2022 1124  Discharge to home or other facility with appropriate resources:   Identify barriers to discharge with patient and caregiver   Identify discharge learning needs (meds, wound care, etc)   Arrange for needed discharge resources and transportation as appropriate   Refer to discharge planning if patient needs post-hospital services based on physician order or complex needs related to functional status, cognitive ability or social support system  Taken 9/27/2022 0815  Discharge to home or other facility with appropriate resources:   Identify barriers to discharge with patient and caregiver   Identify discharge learning needs (meds, wound care, etc)   Arrange for needed discharge resources and transportation as appropriate   Refer to discharge planning if patient needs post-hospital services based on physician order or complex needs related to functional status, cognitive ability or social support system     Problem: Pain  Goal: Verbalizes/displays adequate comfort level or baseline comfort level  Outcome: Progressing     Problem: Safety - Adult  Goal: Free from fall injury  Outcome: Progressing  Flowsheets (Taken 9/27/2022 0815)  Free From Fall Injury: Instruct family/caregiver on patient safety     Problem: ABCDS Injury Assessment  Goal: Absence of physical injury  Outcome: Progressing  Flowsheets (Taken 9/27/2022 0815)  Absence of Physical Injury: Implement safety measures based on patient assessment

## 2022-09-27 NOTE — PROGRESS NOTES
MD placed walk test with RT. Pt remain on 3L. Pt will need wound care following discharge, sent referral to [facility] [place] for RN with wound care instructions. Made PCP f/u appt at [facility] on 9/30/22 at 1000; they accept self pay. When O2 qualifier determined will contact [place] [id number] to arrange home oxygen. 1648-RT saw pt and he does not qualify for home oxygen:        Room air: SpO2 with O2 and liter flow   Resting SpO2  96%  98% on 3L   Ambulating SpO2  92%        Pt ambulated on RA and maintained adequate SPO2 during exercise. Moderate SOB noted during ambulation.      CM updated MD.

## 2022-09-28 ENCOUNTER — HOME HEALTH ADMISSION (OUTPATIENT)
Dept: HOME HEALTH SERVICES | Facility: HOME HEALTH | Age: 42
End: 2022-09-28

## 2022-09-28 PROCEDURE — 6360000002 HC RX W HCPCS: Performed by: FAMILY MEDICINE

## 2022-09-28 PROCEDURE — 2580000003 HC RX 258: Performed by: INTERNAL MEDICINE

## 2022-09-28 PROCEDURE — 1100000000 HC RM PRIVATE

## 2022-09-28 PROCEDURE — 6360000002 HC RX W HCPCS: Performed by: INTERNAL MEDICINE

## 2022-09-28 PROCEDURE — 6370000000 HC RX 637 (ALT 250 FOR IP): Performed by: FAMILY MEDICINE

## 2022-09-28 PROCEDURE — 6370000000 HC RX 637 (ALT 250 FOR IP): Performed by: INTERNAL MEDICINE

## 2022-09-28 RX ORDER — HYDRALAZINE HYDROCHLORIDE 100 MG/1
100 TABLET, FILM COATED ORAL EVERY 12 HOURS SCHEDULED
Qty: 60 TABLET | Refills: 0 | Status: SHIPPED | OUTPATIENT
Start: 2022-09-28

## 2022-09-28 RX ORDER — CEFDINIR 300 MG/1
300 CAPSULE ORAL EVERY 12 HOURS SCHEDULED
Qty: 8 CAPSULE | Refills: 0 | Status: SHIPPED | OUTPATIENT
Start: 2022-09-28 | End: 2022-10-02

## 2022-09-28 RX ORDER — LOSARTAN POTASSIUM 50 MG/1
50 TABLET ORAL DAILY
Qty: 30 TABLET | Refills: 0 | Status: SHIPPED | OUTPATIENT
Start: 2022-09-29 | End: 2022-09-29 | Stop reason: HOSPADM

## 2022-09-28 RX ORDER — DOXYCYCLINE HYCLATE 100 MG/1
100 CAPSULE ORAL EVERY 12 HOURS SCHEDULED
Qty: 8 CAPSULE | Refills: 0 | Status: SHIPPED | OUTPATIENT
Start: 2022-09-28 | End: 2022-10-02

## 2022-09-28 RX ORDER — SPIRONOLACTONE 50 MG/1
50 TABLET, FILM COATED ORAL DAILY
Qty: 30 TABLET | Refills: 0 | Status: SHIPPED | OUTPATIENT
Start: 2022-09-29

## 2022-09-28 RX ORDER — HYDRALAZINE HYDROCHLORIDE 50 MG/1
100 TABLET, FILM COATED ORAL EVERY 12 HOURS SCHEDULED
Status: DISCONTINUED | OUTPATIENT
Start: 2022-09-28 | End: 2022-09-29 | Stop reason: HOSPADM

## 2022-09-28 RX ORDER — VALSARTAN 160 MG/1
160 TABLET ORAL DAILY
Status: DISCONTINUED | OUTPATIENT
Start: 2022-09-29 | End: 2022-09-29 | Stop reason: HOSPADM

## 2022-09-28 RX ORDER — CARVEDILOL 12.5 MG/1
12.5 TABLET ORAL 2 TIMES DAILY WITH MEALS
Qty: 60 TABLET | Refills: 0 | Status: SHIPPED | OUTPATIENT
Start: 2022-09-28

## 2022-09-28 RX ADMIN — ENOXAPARIN SODIUM 60 MG: 100 INJECTION SUBCUTANEOUS at 20:43

## 2022-09-28 RX ADMIN — HYDRALAZINE HYDROCHLORIDE 100 MG: 50 TABLET, FILM COATED ORAL at 13:21

## 2022-09-28 RX ADMIN — HYDROCODONE BITARTRATE AND ACETAMINOPHEN 1 TABLET: 10; 325 TABLET ORAL at 21:47

## 2022-09-28 RX ADMIN — DOXYCYCLINE HYCLATE 100 MG: 100 CAPSULE ORAL at 20:45

## 2022-09-28 RX ADMIN — CEFDINIR 300 MG: 300 CAPSULE ORAL at 20:47

## 2022-09-28 RX ADMIN — LOSARTAN POTASSIUM 50 MG: 50 TABLET, FILM COATED ORAL at 08:21

## 2022-09-28 RX ADMIN — CARVEDILOL 12.5 MG: 12.5 TABLET, FILM COATED ORAL at 08:21

## 2022-09-28 RX ADMIN — HYDROCODONE BITARTRATE AND ACETAMINOPHEN 1 TABLET: 10; 325 TABLET ORAL at 14:10

## 2022-09-28 RX ADMIN — CEFDINIR 300 MG: 300 CAPSULE ORAL at 08:19

## 2022-09-28 RX ADMIN — ANTI-FUNGAL POWDER MICONAZOLE NITRATE TALC FREE: 1.42 POWDER TOPICAL at 20:47

## 2022-09-28 RX ADMIN — HYDRALAZINE HYDROCHLORIDE 10 MG: 20 INJECTION INTRAMUSCULAR; INTRAVENOUS at 11:47

## 2022-09-28 RX ADMIN — ENOXAPARIN SODIUM 60 MG: 100 INJECTION SUBCUTANEOUS at 08:19

## 2022-09-28 RX ADMIN — AMLODIPINE BESYLATE 10 MG: 10 TABLET ORAL at 08:20

## 2022-09-28 RX ADMIN — Medication 250 MG: at 20:45

## 2022-09-28 RX ADMIN — SODIUM CHLORIDE, PRESERVATIVE FREE 5 ML: 5 INJECTION INTRAVENOUS at 19:31

## 2022-09-28 RX ADMIN — ANTI-FUNGAL POWDER MICONAZOLE NITRATE TALC FREE: 1.42 POWDER TOPICAL at 08:21

## 2022-09-28 RX ADMIN — ACETAMINOPHEN 650 MG: 325 TABLET ORAL at 00:25

## 2022-09-28 RX ADMIN — Medication 250 MG: at 08:21

## 2022-09-28 RX ADMIN — HYDROCODONE BITARTRATE AND ACETAMINOPHEN 1 TABLET: 10; 325 TABLET ORAL at 08:20

## 2022-09-28 RX ADMIN — HYDRALAZINE HYDROCHLORIDE 10 MG: 20 INJECTION INTRAMUSCULAR; INTRAVENOUS at 21:47

## 2022-09-28 RX ADMIN — SPIRONOLACTONE 50 MG: 25 TABLET ORAL at 08:20

## 2022-09-28 RX ADMIN — HYDROCHLOROTHIAZIDE 25 MG: 25 TABLET ORAL at 08:20

## 2022-09-28 RX ADMIN — DOXYCYCLINE HYCLATE 100 MG: 100 CAPSULE ORAL at 08:21

## 2022-09-28 RX ADMIN — SODIUM CHLORIDE, PRESERVATIVE FREE 5 ML: 5 INJECTION INTRAVENOUS at 08:21

## 2022-09-28 RX ADMIN — CARVEDILOL 12.5 MG: 12.5 TABLET, FILM COATED ORAL at 16:07

## 2022-09-28 ASSESSMENT — PAIN DESCRIPTION - DESCRIPTORS
DESCRIPTORS: ACHING
DESCRIPTORS: ACHING

## 2022-09-28 ASSESSMENT — PAIN DESCRIPTION - ORIENTATION
ORIENTATION: RIGHT
ORIENTATION: MID

## 2022-09-28 ASSESSMENT — PAIN SCALES - GENERAL
PAINLEVEL_OUTOF10: 8
PAINLEVEL_OUTOF10: 7
PAINLEVEL_OUTOF10: 0
PAINLEVEL_OUTOF10: 4
PAINLEVEL_OUTOF10: 0
PAINLEVEL_OUTOF10: 2
PAINLEVEL_OUTOF10: 7
PAINLEVEL_OUTOF10: 0
PAINLEVEL_OUTOF10: 3

## 2022-09-28 ASSESSMENT — PAIN DESCRIPTION - LOCATION
LOCATION: INCISION
LOCATION: HEAD
LOCATION: ABDOMEN
LOCATION: INCISION

## 2022-09-28 ASSESSMENT — PAIN DESCRIPTION - ONSET: ONSET: ON-GOING

## 2022-09-28 ASSESSMENT — PAIN - FUNCTIONAL ASSESSMENT
PAIN_FUNCTIONAL_ASSESSMENT: PREVENTS OR INTERFERES SOME ACTIVE ACTIVITIES AND ADLS
PAIN_FUNCTIONAL_ASSESSMENT: ACTIVITIES ARE NOT PREVENTED
PAIN_FUNCTIONAL_ASSESSMENT: PREVENTS OR INTERFERES SOME ACTIVE ACTIVITIES AND ADLS

## 2022-09-28 ASSESSMENT — PAIN DESCRIPTION - FREQUENCY: FREQUENCY: CONTINUOUS

## 2022-09-28 ASSESSMENT — PAIN DESCRIPTION - PAIN TYPE: TYPE: ACUTE PAIN

## 2022-09-28 NOTE — PROGRESS NOTES
Pt BP at 1141 was 190/111. PRN hydralazine was given. Pt BP is now 179/86. Dr. Lane Mansfield notified. New orders for oral hydralazine placed. Pt /93 after giving Po Hydralazine. Discharge orders cancelled per Dr. Lane Mansfield.

## 2022-09-28 NOTE — PROGRESS NOTES
Progress Note    Patient: Caren Gillespie MRN: [id number]  SSN: [id number]    YOB: 1980  Age: 37 y.o. Sex: male      Admit Date: 9/22/2022    LOS: 5 days     Assessment and Plan:   37 y.o. male with medical history of Morbid obesity, previous cellulitis, depression and HTN who presented with fever and right upper extremity pain    1. Sepsis secondary to right breast cellulitis complicated with abscess  -Status post I&D  -Cultures with MSSA  -Continue cefazolin and doxycycline  -Symptomatic management    2. Acute hypoxic respiratory failure likely in the setting of SAROJ and OHS  -Oxygen therapy to maintain oxygen saturation more than 92%  -Dr. Colie Cooks discussed with  about BiPAP to arrange for patient but complicated since patient uninsured  -Ambulatory pulse ox    3. Uncontrolled Hypertension  -Continue Norvasc, hydrochlorothiazide, spironolactone, Coreg  -Discontinue losartan  -Start valsartan  -Started hydralazine  -Obtain renal arterial ultrasound    4. Obesity class III  -Increased morbidity and mortality  -Lifestyle modifications    DVT prophylaxis with Lovenox    Subjective:   37 y.o. male with medical history of Morbid obesity, previous cellulitis, depression and HTN who presented with fever and right upper extremity pain. Patient states that on 9/18/22 he started to notice cysts with pain appearing in his right axilla with the pain radiating down the right side toward his breast.  He was evaluated at [place] [facility] and was found to have cellulitis. He was given keflex and bactrim for treatment. While at home however he spiked a fever and the pain intensified despite taking the antibiotics. Patient seen and examined at bedside. This morning the patient still feeling tired. Otherwise denies any chest pain, no abdominal pain, no nausea or vomiting.     Objective:     Vitals:    09/28/22 1141 09/28/22 1308 09/28/22 1409 09/28/22 1410   BP: (!) 190/111 (!) 179/86 (!) 172/96 Pulse: 76      Resp: 18   19   Temp: 97.3 °F (36.3 °C)      TempSrc: Temporal      SpO2: 97%      Weight:       Height:            Intake and Output:  Current Shift: 09/28 0701 - 09/28 1900  In: 475 [P.O.:475]  Out: 0263 [Urine:1650]  Last three shifts: 09/26 1901 - 09/28 0700  In: 150 [P.O.:150]  Out: 2625 [Urine:2625]    ROS  10 ROS negative except from stated on subjective    Physical Exam:   General: Alert, oriented, NAD  HEENT: NC/AT, EOM are intact  Neck: supple, no JVD  Cardiovascular: RRR, S1, S2, no murmurs  Respiratory: Lungs are clear, no wheezes or rales  Abdomen: Soft, NT, ND  Back: No CVA tenderness, no paraspinal tenderness  Extremities: LE without pedal edema, no erythema  Neuro: A&O, CN are intact, no focal deficits  Skin: no rash or ulcers  Psych: good mood and affect    Lab/Data Review:  I have personally reviewed patients laboratory data showing  No results found for this or any previous visit (from the past 24 hour(s)). [unfilled]     Image:  I have personally reviewed patients imaging showing  CT CHEST ABDOMEN PELVIS W CONTRAST   Final Result   1. Changes suggestive of cellulitis in the right axilla and adjacent chest wall. 2. No acute intra-abdominal or intrathoracic process. XR CHEST PORTABLE   Final Result   No acute process.       Vascular duplex renal arterial complete    (Results Pending)        Current Facility-Administered Medications   Medication Dose Route Frequency Provider Last Rate Last Admin    hydrALAZINE (APRESOLINE) tablet 100 mg  100 mg Oral 2 times per day Arlin Lazar MD   100 mg at 09/28/22 1321    [START ON 9/29/2022] valsartan (DIOVAN) tablet 160 mg  160 mg Oral Daily Arlin Lazar MD        cefdinir (OMNICEF) capsule 300 mg  300 mg Oral 2 times per day Arlin Lazar MD   300 mg at 09/28/22 8298    doxycycline hyclate (VIBRAMYCIN) capsule 100 mg  100 mg Oral 2 times per day Arlin Lazar MD   100 mg at 09/28/22 9895 hydrALAZINE (APRESOLINE) injection 10 mg  10 mg IntraVENous Q6H PRN Durga Lowe MD   10 mg at 09/28/22 1147    carvedilol (COREG) tablet 12.5 mg  12.5 mg Oral BID  Thu Thornton, DO   12.5 mg at 09/28/22 5834    morphine injection 2 mg  2 mg IntraVENous Q4H PRN Thu Thornton, DO   2 mg at 09/27/22 2122    saccharomyces boulardii (FLORASTOR) capsule 250 mg  250 mg Oral BID Thu Thornton, DO   250 mg at 09/28/22 0821    miconazole (MICOTIN) 2 % powder   Topical BID Denny Campos, DO   Given at 09/28/22 0821    amLODIPine (NORVASC) tablet 10 mg  10 mg Oral Daily Ajith Griffin MD   10 mg at 09/28/22 0820    hydroCHLOROthiazide (HYDRODIURIL) tablet 25 mg  25 mg Oral Daily Ajith Griffin MD   25 mg at 09/28/22 0820    sodium chloride flush 0.9 % injection 5-40 mL  5-40 mL IntraVENous 2 times per day Ajith Griffin MD   5 mL at 09/28/22 0821    sodium chloride flush 0.9 % injection 5-40 mL  5-40 mL IntraVENous PRN Ajith Griffin MD        0.9 % sodium chloride infusion   IntraVENous PRN Ajith Griffin MD        enoxaparin (LOVENOX) injection 60 mg  60 mg SubCUTAneous BID Ajith Griffin MD   60 mg at 09/28/22 0819    ondansetron (ZOFRAN-ODT) disintegrating tablet 4 mg  4 mg Oral Q8H PRN Ajith Griffin MD   4 mg at 09/24/22 1837    Or    ondansetron (ZOFRAN) injection 4 mg  4 mg IntraVENous Q6H PRN Ajith Griffin MD        polyethylene glycol (GLYCOLAX) packet 17 g  17 g Oral Daily PRN Ajith Griffin MD        acetaminophen (TYLENOL) tablet 650 mg  650 mg Oral Q6H PRN Ajith Griffin MD   650 mg at 09/28/22 0025    Or    acetaminophen (TYLENOL) suppository 650 mg  650 mg Rectal Q6H PRN Ajith Griffin MD        HYDROcodone-acetaminophen [facility])  MG per tablet 1 tablet  1 tablet Oral Q6H PRN Ajith Griffin MD   1 tablet at 09/28/22 1410    spironolactone (ALDACTONE) tablet 50 mg  50 mg Oral Daily Ajith Griffin MD   50 mg at 09/28/22 [facility] problems     Patient Active Problem List   Diagnosis    Depression Essential hypertension    Morbid obesity with BMI of 70 and over, adult ([facility] [address]    Cellulitis of right axilla    Sepsis ([facility] [address]        I have reviewed, updated, and verified this note's content and spent 36 minutes of my 40 minutes visit performing counseling and coordination of care regarding medical management.        Signed By: Traci Del Cid MD     September 28, 2022

## 2022-09-28 NOTE — WOUND CARE
Patient seen for follow up. Wound  and has started to decrease in depth. Continue silver dressing for now. Discussed with patient and his mother. Answered all questions. Will follow 1-2 times per week while in acute care.

## 2022-09-28 NOTE — CARE COORDINATION
Discharge order is in. Pt is discharging home today in stable condition. CM arranged PCP and [facility] with RN for wound care. Provided pt with a [place] application. The following medications were vouched ($91.21):  Cozaar 50mg #30-$18.31  Aldactone 50mg #30-$12.21  Omnicef 300mg #8-$12.27  Vibramycin 100mg #8-$16.08  Coreg 12.5mg #60-$32.34  No other discharge needs were identified. Tx goasl have been met. Pts mother at bedside and will transport him home. 1451-Discharge cancelled. Pts BP-190/111.     09/28/22 1449 [facility] Discharge   Transition of Care Consult (CM Consult) Discharge Planning;Home Health   Internal [facility] Discharge Nursing services; Critical access hospital[address] Resource Information Provided? No   Mode of Transport at Discharge Other (see comment)  (Family)   Confirm Follow Up Transport Family   Condition of Participation: Discharge Planning   The Patient and/or Patient Representative was provided with a Choice of Provider? Patient   The Patient and/Or Patient Representative agree with the Discharge Plan? Yes   Freedom of Choice list was provided with basic dialogue that supports the patient's individualized plan of care/goals, treatment preferences, and shares the quality data associated with the providers?   Yes

## 2022-09-29 ENCOUNTER — APPOINTMENT (OUTPATIENT)
Dept: ULTRASOUND IMAGING | Age: 42
DRG: 871 | End: 2022-09-29

## 2022-09-29 VITALS
HEART RATE: 75 BPM | WEIGHT: 315 LBS | OXYGEN SATURATION: 94 % | HEIGHT: 66 IN | BODY MASS INDEX: 50.62 KG/M2 | SYSTOLIC BLOOD PRESSURE: 144 MMHG | RESPIRATION RATE: 20 BRPM | TEMPERATURE: 97.7 F | DIASTOLIC BLOOD PRESSURE: 90 MMHG

## 2022-09-29 LAB
BACTERIA SPEC CULT: NORMAL
BACTERIA SPEC CULT: NORMAL
SERVICE CMNT-IMP: NORMAL
SERVICE CMNT-IMP: NORMAL

## 2022-09-29 PROCEDURE — 6360000002 HC RX W HCPCS: Performed by: FAMILY MEDICINE

## 2022-09-29 PROCEDURE — 6370000000 HC RX 637 (ALT 250 FOR IP): Performed by: FAMILY MEDICINE

## 2022-09-29 PROCEDURE — 6370000000 HC RX 637 (ALT 250 FOR IP): Performed by: HOSPITALIST

## 2022-09-29 PROCEDURE — 2580000003 HC RX 258: Performed by: INTERNAL MEDICINE

## 2022-09-29 PROCEDURE — 6370000000 HC RX 637 (ALT 250 FOR IP): Performed by: INTERNAL MEDICINE

## 2022-09-29 PROCEDURE — 6360000002 HC RX W HCPCS: Performed by: INTERNAL MEDICINE

## 2022-09-29 PROCEDURE — 93975 VASCULAR STUDY: CPT

## 2022-09-29 RX ORDER — HYDROCHLOROTHIAZIDE 50 MG/1
50 TABLET ORAL DAILY
Qty: 30 TABLET | Refills: 0 | Status: SHIPPED | OUTPATIENT
Start: 2022-09-30

## 2022-09-29 RX ORDER — VALSARTAN 160 MG/1
160 TABLET ORAL DAILY
Qty: 30 TABLET | Refills: 0 | Status: SHIPPED | OUTPATIENT
Start: 2022-09-30

## 2022-09-29 RX ORDER — CLONIDINE HYDROCHLORIDE 0.1 MG/1
0.1 TABLET ORAL EVERY 4 HOURS PRN
Status: DISCONTINUED | OUTPATIENT
Start: 2022-09-29 | End: 2022-09-29 | Stop reason: HOSPADM

## 2022-09-29 RX ORDER — HYDROCHLOROTHIAZIDE 25 MG/1
50 TABLET ORAL DAILY
Status: DISCONTINUED | OUTPATIENT
Start: 2022-09-29 | End: 2022-09-29 | Stop reason: HOSPADM

## 2022-09-29 RX ADMIN — ENOXAPARIN SODIUM 60 MG: 100 INJECTION SUBCUTANEOUS at 08:18

## 2022-09-29 RX ADMIN — CEFDINIR 300 MG: 300 CAPSULE ORAL at 10:01

## 2022-09-29 RX ADMIN — AMLODIPINE BESYLATE 10 MG: 10 TABLET ORAL at 08:18

## 2022-09-29 RX ADMIN — Medication 250 MG: at 08:18

## 2022-09-29 RX ADMIN — SODIUM CHLORIDE, PRESERVATIVE FREE 10 ML: 5 INJECTION INTRAVENOUS at 08:19

## 2022-09-29 RX ADMIN — DOXYCYCLINE HYCLATE 100 MG: 100 CAPSULE ORAL at 08:18

## 2022-09-29 RX ADMIN — ANTI-FUNGAL POWDER MICONAZOLE NITRATE TALC FREE: 1.42 POWDER TOPICAL at 08:20

## 2022-09-29 RX ADMIN — SPIRONOLACTONE 50 MG: 25 TABLET ORAL at 08:18

## 2022-09-29 RX ADMIN — VALSARTAN 160 MG: 160 TABLET, FILM COATED ORAL at 08:18

## 2022-09-29 RX ADMIN — HYDRALAZINE HYDROCHLORIDE 10 MG: 20 INJECTION INTRAMUSCULAR; INTRAVENOUS at 05:32

## 2022-09-29 RX ADMIN — CARVEDILOL 12.5 MG: 12.5 TABLET, FILM COATED ORAL at 08:18

## 2022-09-29 RX ADMIN — MORPHINE SULFATE 2 MG: 2 INJECTION, SOLUTION INTRAMUSCULAR; INTRAVENOUS at 00:22

## 2022-09-29 RX ADMIN — HYDROCHLOROTHIAZIDE 50 MG: 25 TABLET ORAL at 08:18

## 2022-09-29 RX ADMIN — HYDRALAZINE HYDROCHLORIDE 100 MG: 50 TABLET, FILM COATED ORAL at 08:18

## 2022-09-29 RX ADMIN — CLONIDINE HYDROCHLORIDE 0.1 MG: 0.1 TABLET ORAL at 03:28

## 2022-09-29 ASSESSMENT — PAIN SCALES - GENERAL
PAINLEVEL_OUTOF10: 0
PAINLEVEL_OUTOF10: 4
PAINLEVEL_OUTOF10: 9

## 2022-09-29 ASSESSMENT — PAIN DESCRIPTION - DESCRIPTORS: DESCRIPTORS: ACHING

## 2022-09-29 ASSESSMENT — PAIN DESCRIPTION - LOCATION: LOCATION: ABDOMEN

## 2022-09-29 ASSESSMENT — PAIN DESCRIPTION - ORIENTATION: ORIENTATION: RIGHT

## 2022-09-29 NOTE — PROGRESS NOTES
Pt arrived to room 804 via bed from 3rd floor telemetry. Pt alert oriented times 3 at this time. Pt has wound to right upper abd, with dressing in place. Pt complaints of soreness to site, wound not visualized at this time. Pt has no other wounds noted. Pt on RA, no distress noted, call light in reach, will monitor.

## 2022-09-29 NOTE — CARE COORDINATION
MSN, CM:  patient to be discharged home today with [facility] services ordered. PCP sent to [facility] from prior CM and patient was given med vouchers from prior CM. Patient agrees with this discharge plan and has met all milestones for this admission. Family to transport patient home. 09/29/22 1237   Service Assessment   Patient Orientation Alert and [address] [place] Discharge   Transition of Care Consult (CM Consult) [facility])   Internal Home Health Yes   Mode of Transport at Discharge Other (see comment)  (family to transport)   Confirm Follow Up Transport Family   Condition of Participation: Discharge Planning   The Plan for Transition of Care is related to the following treatment goals: Home Health to regain strenght back to baseline   The Patient and/or Patient Representative was provided with a Choice of Provider? Patient   The Patient and/Or Patient Representative agree with the Discharge Plan? Yes   Freedom of Choice list was provided with basic dialogue that supports the patient's individualized plan of care/goals, treatment preferences, and shares the quality data associated with the providers?   Yes

## 2022-09-29 NOTE — PROGRESS NOTES
TRANSFER - IN REPORT:    Verbal report received from KARIS Ramey on [address]  being received from 3rd floor tele for routine progression of patient care      Report consisted of patient's Situation, Background, Assessment and   Recommendations(SBAR). Information from the following report(s) SBAR, MAR was reviewed with the receiving nurse. Opportunity for questions and clarification was provided.

## 2022-09-29 NOTE — PROGRESS NOTES
Pt returns to room from [address]. No distress noted at this time. Call light in reach, will monitor.

## 2022-09-29 NOTE — PROGRESS NOTES
[facility] [address] [address]  Phone: [phone]             September 29, 2022    Patient: Ced Officer   YOB: 1980   Date of Visit: 9/22/2022       To Whom It May Concern:    Ewelina Lerner was seen and treated in our facility  beginning 9/22/2022 until 9/29/22.        Sincerely,       Liza Sam RN         Signature:__________________________________

## 2022-09-29 NOTE — PROGRESS NOTES
TRANSFER - OUT REPORT:    Verbal report given to Bennie Reardon RN on [address]  being transferred to 8th floor [facility] for routine progression of patient care       Report consisted of patient's Situation, Background, Assessment and   Recommendations(SBAR). Information from the following report(s) Nurse Handoff Report was reviewed with the receiving nurse. Lines:   Peripheral IV 09/26/22 Distal;Left; Anterior Cephalic (Active)   Site Assessment Clean, dry & intact 09/29/22 0430   Line Status Normal saline locked; Flushed;Capped 09/29/22 0430   Line Care Cap changed; Connections checked and tightened 09/29/22 0430   Phlebitis Assessment No symptoms 09/29/22 0430   Infiltration Assessment 0 09/29/22 0430   Alcohol Cap Used Yes 09/29/22 0430   Dressing Status Clean, dry & intact 09/29/22 0430   Dressing Type Transparent 09/29/22 0430        Opportunity for questions and clarification was provided.       Patient transported with:  Patient-specific medications from Pharmacy and Registered Nurse

## 2022-09-29 NOTE — PROGRESS NOTES
DC instructions given pt. Pt being DC home with home health. Pt medications, appts, and instructions given, prescriptions sent home with.  Work excuse sent home pt awaiting ride and then will be DC home

## 2022-09-29 NOTE — PLAN OF CARE
Problem: Discharge Planning  Goal: Discharge to home or other facility with appropriate resources  Outcome: Adequate for Discharge     Problem: Pain  Goal: Verbalizes/displays adequate comfort level or baseline comfort level  Outcome: Adequate for Discharge     Problem: Safety - Adult  Goal: Free from fall injury  Outcome: Adequate for Discharge     Problem: ABCDS Injury Assessment  Goal: Absence of physical injury  Outcome: Adequate for Discharge     Problem: Skin/Tissue Integrity  Goal: Absence of new skin breakdown  Description: 1. Monitor for areas of redness and/or skin breakdown  2. Assess vascular access sites hourly  3. Every 4-6 hours minimum:  Change oxygen saturation probe site  4. Every 4-6 hours:  If on nasal continuous positive airway pressure, respiratory therapy assess nares and determine need for appliance change or resting period.   Outcome: Adequate for Discharge

## 2022-09-29 NOTE — DISCHARGE SUMMARY
Date of Admission: 9/22/2022  Date of Discharge: 9/29/2022    Discharge Diagnoses:  Principal Problem:    Sepsis ([facility] [address]  Active Problems:    Depression    Essential hypertension    Morbid obesity with BMI of 70 and over, adult ([facility] [address]    Cellulitis of right axilla  Resolved Problems:    * No resolved hospital problems.  *       Discharge Medications:  Discharge Medication List as of 9/29/2022 12:57 PM        START taking these medications    Details   valsartan (DIOVAN) 160 MG tablet Take 1 tablet by mouth daily, Disp-30 tablet, R-0Print      carvedilol (COREG) 12.5 MG tablet Take 1 tablet by mouth 2 times daily (with meals), Disp-60 tablet, R-0Print      cefdinir (OMNICEF) 300 MG capsule Take 1 capsule by mouth every 12 hours for 8 doses, Disp-8 capsule, R-0Print      doxycycline hyclate (VIBRAMYCIN) 100 MG capsule Take 1 capsule by mouth every 12 hours for 8 doses, Disp-8 capsule, R-0Print      spironolactone (ALDACTONE) 50 MG tablet Take 1 tablet by mouth daily, Disp-30 tablet, R-0Print      hydrALAZINE (APRESOLINE) 100 MG tablet Take 1 tablet by mouth every 12 hours, Disp-60 tablet, R-0Print           CONTINUE these medications which have CHANGED    Details   hydroCHLOROthiazide (HYDRODIURIL) 50 MG tablet Take 1 tablet by mouth daily, Disp-30 tablet, R-0Print           CONTINUE these medications which have NOT CHANGED    Details   amLODIPine (NORVASC) 10 MG tablet Take 10 mg by mouth dailyHistorical Med           STOP taking these medications       losartan (COZAAR) 50 MG tablet Comments:   Reason for Stopping:         sulfamethoxazole-trimethoprim (BACTRIM DS;SEPTRA DS) 800-160 MG per tablet Comments:   Reason for Stopping:         cephALEXin (KEFLEX) 500 MG capsule Comments:   Reason for Stopping:               Pending Labs:  None    Follow-up (including scheduled tests):  PMD    History of Present Illness:  37 y.o. male with medical history of Morbid obesity, previous cellulitis, depression and HTN who presented with fever and right upper extremity pain. Patient states that on 9/18/22 he started to notice cysts with pain appearing in his right axilla with the pain radiating down the right side toward his breast.  He was evaluated at [facility] and was found to have cellulitis. He was given keflex and bactrim for treatment. While at home however he spiked a fever and the pain intensified despite taking the antibiotics. He denies chest pain, sob, nausea and vomiting    Past Medical History:  Past Medical History:   Diagnosis Date    Hypertension        Allergies:  No Known Allergies    Hospital Course:  37 y.o. male with medical history of Morbid obesity, previous cellulitis, depression and HTN who presented with fever and right upper extremity pain     1. Sepsis secondary to right breast cellulitis complicated with abscess  -Surgery consulted  -Status post I&D  -Cultures with [facility]  -Started cefazolin and doxycycline  -Improved symptomatology  -Discharged on cefdinir and doxycycline    2. Acute hypoxic respiratory failure likely in the setting of SAROJ and OHS  -Oxygen therapy to maintain oxygen saturation more than 92%  -No oxygen requirements on discharge    3. Uncontrolled Hypertension  -Started on Norvasc, hydrochlorothiazide, spironolactone, Coreg, valsartan, hydralazine  -Renal arterial ultrasound without renal artery stenosis    Procedures:  I&D    Discharge Day Information:  Follow with PMD      Discharge Physical Exam:  General: Alert, oriented, NAD  HEENT: NC/AT, EOM are intact  Neck: supple, no JVD  Cardiovascular: RRR, S1, S2, no murmurs  Respiratory: Lungs are clear, no wheezes or rales  Abdomen: Soft, NT, ND  Back: No CVA tenderness, no paraspinal tenderness  Extremities: LE without pedal edema, no erythema  Neuro: A&O, CN are intact, no focal deficits  Skin: no rash or ulcers  Psych: good mood and affect    No results found for this or any previous visit (from the past 24 hour(s)).      Vascular duplex renal arterial complete   Final Result   1. No evidence of renal artery stenosis. CT CHEST ABDOMEN PELVIS W CONTRAST   Final Result   1. Changes suggestive of cellulitis in the right axilla and adjacent chest wall. 2. No acute intra-abdominal or intrathoracic process. XR CHEST PORTABLE   Final Result   No acute process.            Condition: Improved    Disposition: Home health    Consultants During This Hospitalization: Surgery            Spent 31 minutes on discharge services

## 2022-09-29 NOTE — DISCHARGE INSTRUCTIONS
DISCHARGE SUMMARY from Nurse    PATIENT INSTRUCTIONS:    After general anesthesia or intravenous sedation, for 24 hours or while taking prescription Narcotics:  Limit your activities  Do not drive and operate hazardous machinery  Do not make important personal or business decisions  Do  not drink alcoholic beverages  If you have not urinated within 8 hours after discharge, please contact your surgeon on call. Report the following to your surgeon:  Excessive pain, swelling, redness or odor of or around the surgical area  Temperature over 100.5  Nausea and vomiting lasting longer than 4 hours or if unable to take medications  Any signs of decreased circulation or nerve impairment to extremity: change in color, persistent  numbness, tingling, coldness or increase pain  Any questions    What to do at Home:  Recommended activity: activity as tolerated    If you experience any of the following symptoms shortness of breath not relieved by rest, pain not relieved by medications, chest pain or pressure, temperature greater than 101, increase in drainage or redness to wound or increase in weakness fatigue or swelling please follow up with MD.    *  Please give a list of your current medications to your Primary Care Provider. *  Please update this list whenever your medications are discontinued, doses are      changed, or new medications (including over-the-counter products) are added. *  Please carry medication information at all times in case of emergency situations. These are general instructions for a healthy lifestyle:    No smoking/ No tobacco products/ Avoid exposure to second hand smoke  Surgeon General's Warning:  Quitting smoking now greatly reduces serious risk to your health.     Obesity, smoking, and sedentary lifestyle greatly increases your risk for illness    A healthy diet, regular physical exercise & weight monitoring are important for maintaining a healthy lifestyle    You may be retaining fluid if you have a history of heart failure or if you experience any of the following symptoms:  Weight gain of 3 pounds or more overnight or 5 pounds in a week, increased swelling in our hands or feet or shortness of breath while lying flat in bed. Please call your doctor as soon as you notice any of these symptoms; do not wait until your next office visit. The discharge information has been reviewed with the patient. The patient verbalized understanding. Discharge medications reviewed with the patient and appropriate educational materials and side effects teaching were provided.   ___________________________________________________________________________________________________________________________________

## 2022-09-30 ENCOUNTER — HOME CARE VISIT (OUTPATIENT)
Dept: SCHEDULING | Facility: HOME HEALTH | Age: 42
End: 2022-09-30

## 2022-09-30 ENCOUNTER — OFFICE VISIT (OUTPATIENT)
Dept: PRIMARY CARE CLINIC | Facility: CLINIC | Age: 42
End: 2022-09-30

## 2022-09-30 ENCOUNTER — TELEPHONE (OUTPATIENT)
Dept: INTERNAL MEDICINE CLINIC | Facility: CLINIC | Age: 42
End: 2022-09-30

## 2022-09-30 VITALS
SYSTOLIC BLOOD PRESSURE: 160 MMHG | TEMPERATURE: 97.9 F | RESPIRATION RATE: 17 BRPM | WEIGHT: 315 LBS | HEIGHT: 66 IN | HEART RATE: 80 BPM | BODY MASS INDEX: 50.62 KG/M2 | OXYGEN SATURATION: 97 % | DIASTOLIC BLOOD PRESSURE: 106 MMHG

## 2022-09-30 DIAGNOSIS — L02.411 ABSCESS OF RIGHT AXILLA: Primary | ICD-10-CM

## 2022-09-30 DIAGNOSIS — I10 PRIMARY HYPERTENSION: ICD-10-CM

## 2022-09-30 PROCEDURE — G0299 HHS/HOSPICE OF RN EA 15 MIN: HCPCS

## 2022-09-30 PROCEDURE — 400013 HH SOC

## 2022-09-30 PROCEDURE — 99213 OFFICE O/P EST LOW 20 MIN: CPT | Performed by: PHYSICIAN ASSISTANT

## 2022-09-30 RX ORDER — CARVEDILOL 6.25 MG/1
12.5 TABLET ORAL 2 TIMES DAILY
COMMUNITY
Start: 2021-11-29 | End: 2022-09-30

## 2022-09-30 SDOH — ECONOMIC STABILITY: HOUSING INSECURITY
IN THE LAST 12 MONTHS, WAS THERE A TIME WHEN YOU DID NOT HAVE A STEADY PLACE TO SLEEP OR SLEPT IN A SHELTER (INCLUDING NOW)?: NO

## 2022-09-30 SDOH — ECONOMIC STABILITY: FOOD INSECURITY: WITHIN THE PAST 12 MONTHS, YOU WORRIED THAT YOUR FOOD WOULD RUN OUT BEFORE YOU GOT MONEY TO BUY MORE.: NEVER TRUE

## 2022-09-30 SDOH — ECONOMIC STABILITY: INCOME INSECURITY: IN THE LAST 12 MONTHS, WAS THERE A TIME WHEN YOU WERE NOT ABLE TO PAY THE MORTGAGE OR RENT ON TIME?: NO

## 2022-09-30 SDOH — ECONOMIC STABILITY: FOOD INSECURITY: WITHIN THE PAST 12 MONTHS, THE FOOD YOU BOUGHT JUST DIDN'T LAST AND YOU DIDN'T HAVE MONEY TO GET MORE.: NEVER TRUE

## 2022-09-30 ASSESSMENT — ENCOUNTER SYMPTOMS
EYE PAIN: 0
HEMOPTYSIS: 0
DIARRHEA: 0
SORE THROAT: 0
CONSTIPATION: 0
VOMITING: 0
STOOL DESCRIPTION: FORMED
DYSPNEA ACTIVITY LEVEL: AFTER AMBULATING 10 - 20 FT
ABDOMINAL PAIN: 0
PAIN LOCATION - PAIN QUALITY: STABBING
SHORTNESS OF BREATH: 0

## 2022-09-30 ASSESSMENT — SOCIAL DETERMINANTS OF HEALTH (SDOH): HOW HARD IS IT FOR YOU TO PAY FOR THE VERY BASICS LIKE FOOD, HOUSING, MEDICAL CARE, AND HEATING?: NOT HARD AT ALL

## 2022-09-30 ASSESSMENT — PATIENT HEALTH QUESTIONNAIRE - PHQ9: DEPRESSION UNABLE TO ASSESS: NO

## 2022-09-30 NOTE — TELEPHONE ENCOUNTER
Called patient to schedule TCV appt following discharge from [facility] 09/29/2022. Dx Sepsis [facility])    Patient has appt scheduled for 09/30/2022 at [facility] to get  referral to [address] L/RADHA for patient to call.

## 2022-09-30 NOTE — PROGRESS NOTES
Juventino Rahman (:  1980) is a 37 y.o. male here for evaluation of the following chief complaint(s):  Follow-Up from Hospital (Patient states he was told to come here today to get wound clinic referral after recent admission for right axilla cellulitis/abscess. )         ASSESSMENT/PLAN:  1. Abscess of right axilla  -     [facility], Wound Care  2. Primary hypertension  Continue current medications. Diet/exercise/lifestyle modifications discussed  Follow up 2 weeks for recheck. No results found for any visits on 22. Return in about 2 weeks (around 10/14/2022) for blood pressure check. Subjective   SUBJECTIVE/OBJECTIVE:  Pt here today for hospital follow up and says he needs wound care referral. He was hospitalized last week for cellulitis/abscess right axilla. He was admitted for IV abx and I&D. Discharged on cephalosporin and doxy. Pt says he has improved. He has home health nurse coming daily to repack and change dressing. He also has HTN and no current PCP. Was being seen at [facility] but no longer has sponsorship and does not have insurance. He would like to establish here for HTN management.        Allergies   Allergen Reactions    Lisinopril Cough    Strawberry Hives     Current Outpatient Medications   Medication Sig Dispense Refill    hydroCHLOROthiazide (HYDRODIURIL) 50 MG tablet Take 1 tablet by mouth daily 30 tablet 0    valsartan (DIOVAN) 160 MG tablet Take 1 tablet by mouth daily 30 tablet 0    carvedilol (COREG) 12.5 MG tablet Take 1 tablet by mouth 2 times daily (with meals) 60 tablet 0    cefdinir (OMNICEF) 300 MG capsule Take 1 capsule by mouth every 12 hours for 8 doses 8 capsule 0    doxycycline hyclate (VIBRAMYCIN) 100 MG capsule Take 1 capsule by mouth every 12 hours for 8 doses 8 capsule 0    spironolactone (ALDACTONE) 50 MG tablet Take 1 tablet by mouth daily 30 tablet 0    hydrALAZINE (APRESOLINE) 100 MG tablet Take 1 tablet by mouth every 12 hours 60 tablet 0    amLODIPine (NORVASC) 10 MG tablet Take 10 mg by mouth daily       No current facility-administered medications for this visit. Past Medical History:   Diagnosis Date    Hypertension      Past Surgical History:   Procedure Laterality Date    ARM SURGERY Right 9/25/2022    RIGHT AXILLARY IRRIGATION AND DEBRIDEMENT performed by Carlos Schofield MD at [facility] MAIN OR    CYST REMOVAL      TONSILLECTOMY       Social History     Tobacco Use    Smoking status: Never    Smokeless tobacco: Never   Substance Use Topics    Alcohol use: Never    Drug use: Never      History reviewed. No pertinent family history. Review of Systems   Constitutional:  Negative for chills, fatigue, fever and unexpected weight change. HENT:  Negative for congestion and sore throat. Eyes:  Negative for pain. Respiratory:  Negative for shortness of breath. Cardiovascular:  Negative for chest pain. Gastrointestinal:  Negative for abdominal pain, constipation, diarrhea and vomiting. Genitourinary:  Negative for dysuria. Musculoskeletal:  Negative for gait problem. Skin:  Positive for wound. Negative for rash. Allergic/Immunologic: Negative for immunocompromised state. Neurological:  Negative for dizziness, weakness and headaches. Objective   Physical Exam  Constitutional:       Appearance: He is obese. Cardiovascular:      Rate and Rhythm: Normal rate and regular rhythm. Pulmonary:      Effort: Pulmonary effort is normal.      Breath sounds: Normal breath sounds. Skin:     Comments: Bandaged wound to right axilla. Patient says his home health nurse just came out earlier today to repack and dress the wound so this was not removed during visit. There is no surrounding erythema, edema or tenderness. Neurological:      General: No focal deficit present. Mental Status: He is alert.    Psychiatric:         Mood and Affect: Mood normal.         Behavior: Behavior normal.                An electronic signature was used to authenticate this note.     --Bartolo Cabrera PA

## 2022-10-01 ENCOUNTER — HOME CARE VISIT (OUTPATIENT)
Dept: HOME HEALTH SERVICES | Facility: HOME HEALTH | Age: 42
End: 2022-10-01

## 2022-10-03 ENCOUNTER — TELEPHONE (OUTPATIENT)
Dept: HOME HEALTH SERVICES | Facility: HOME HEALTH | Age: 42
End: 2022-10-03

## 2022-10-03 DIAGNOSIS — L02.411 ABSCESS OF RIGHT AXILLA: Primary | ICD-10-CM

## 2022-10-03 LAB
AEROBE ID RESULT 1: ABNORMAL
BACTERIA ISLT: ABNORMAL
SPECIMEN SOURCE: ABNORMAL

## 2022-10-03 NOTE — TELEPHONE ENCOUNTER
[address] Pharmacist consult. Mr. Onesimo Lopez was discharged from [facility] after having sepsis due to chest abcess. I called to review his discharge medications with him and got this voice message: The person you are calling cannot accept calls at this time. The phone hangs up at this point. I will try again another time.

## 2022-10-04 ENCOUNTER — HOME CARE VISIT (OUTPATIENT)
Dept: SCHEDULING | Facility: HOME HEALTH | Age: 42
End: 2022-10-04

## 2022-10-04 LAB
AMPICILLIN MIC: NORMAL UG/ML
BACTERIA ISLT: ABNORMAL
BACTERIA SPEC CULT: ABNORMAL
BACTERIA SPEC: NORMAL
ERYTHROMYCIN MIC: NORMAL UG/ML
GENTAMICIN MIC: NORMAL UG/ML
GRAM STN SPEC: ABNORMAL
GRAM STN SPEC: ABNORMAL
ORGANISM ID: ABNORMAL
PENICILLIN MIC: NORMAL UG/ML
RIFAMPIN MIC: NORMAL UG/ML
SERVICE CMNT-IMP: ABNORMAL
SPECIMEN SOURCE: ABNORMAL
TETRACYCLINE MIC: NORMAL UG/ML
VANCOMYCIN MIC: NORMAL UG/ML

## 2022-10-05 NOTE — TELEPHONE ENCOUNTER
I called again to review his medications with him. Same message that he is not accepting calls. I see a note from the [facility] RN that he is being discharged from [facility] as he is refusing further care. I will close this note.

## 2022-10-12 VITALS
TEMPERATURE: 97.8 F | RESPIRATION RATE: 18 BRPM | SYSTOLIC BLOOD PRESSURE: 150 MMHG | WEIGHT: 315 LBS | DIASTOLIC BLOOD PRESSURE: 88 MMHG | HEART RATE: 89 BPM | BODY MASS INDEX: 77.47 KG/M2 | OXYGEN SATURATION: 97 %

## 2022-10-19 LAB
BACTERIA SPEC CULT: ABNORMAL
BACTERIA SPEC CULT: ABNORMAL
Lab: NORMAL
Lab: NORMAL
REFERENCE LAB: NORMAL
SERVICE CMNT-IMP: ABNORMAL

## 2023-12-12 ENCOUNTER — HOSPITAL ENCOUNTER (EMERGENCY)
Age: 43
Discharge: HOME | End: 2023-12-12
Attending: EMERGENCY MEDICINE

## 2023-12-12 ENCOUNTER — APPOINTMENT (OUTPATIENT)
Dept: GENERAL RADIOLOGY | Age: 43
End: 2023-12-12

## 2023-12-12 VITALS
RESPIRATION RATE: 22 BRPM | WEIGHT: 315 LBS | SYSTOLIC BLOOD PRESSURE: 169 MMHG | HEIGHT: 66 IN | BODY MASS INDEX: 50.62 KG/M2 | HEART RATE: 75 BPM | OXYGEN SATURATION: 97 % | DIASTOLIC BLOOD PRESSURE: 102 MMHG | TEMPERATURE: 98.1 F

## 2023-12-12 DIAGNOSIS — E66.01 CLASS 3 SEVERE OBESITY WITH BODY MASS INDEX (BMI) OF 60.0 TO 69.9 IN ADULT, UNSPECIFIED OBESITY TYPE, UNSPECIFIED WHETHER SERIOUS COMORBIDITY PRESENT (HCC): Primary | ICD-10-CM

## 2023-12-12 DIAGNOSIS — M25.422 EFFUSION OF JOINT OF LEFT UPPER ARM: ICD-10-CM

## 2023-12-12 DIAGNOSIS — M17.12 ARTHRITIS OF KNEE, LEFT: ICD-10-CM

## 2023-12-12 PROCEDURE — 73562 X-RAY EXAM OF KNEE 3: CPT

## 2023-12-12 PROCEDURE — 96372 THER/PROPH/DIAG INJ SC/IM: CPT

## 2023-12-12 PROCEDURE — 99284 EMERGENCY DEPT VISIT MOD MDM: CPT

## 2023-12-12 PROCEDURE — 6360000002 HC RX W HCPCS: Performed by: EMERGENCY MEDICINE

## 2023-12-12 PROCEDURE — 6370000000 HC RX 637 (ALT 250 FOR IP): Performed by: EMERGENCY MEDICINE

## 2023-12-12 RX ORDER — HYDROCODONE BITARTRATE AND ACETAMINOPHEN 5; 325 MG/1; MG/1
1 TABLET ORAL EVERY 8 HOURS PRN
Qty: 9 TABLET | Refills: 0 | Status: SHIPPED | OUTPATIENT
Start: 2023-12-12 | End: 2023-12-15

## 2023-12-12 RX ORDER — MELOXICAM 15 MG/1
15 TABLET ORAL DAILY PRN
Qty: 30 TABLET | Refills: 0 | Status: SHIPPED | OUTPATIENT
Start: 2023-12-12

## 2023-12-12 RX ORDER — KETOROLAC TROMETHAMINE 30 MG/ML
30 INJECTION, SOLUTION INTRAMUSCULAR; INTRAVENOUS
Status: COMPLETED | OUTPATIENT
Start: 2023-12-12 | End: 2023-12-12

## 2023-12-12 RX ORDER — HYDROCODONE BITARTRATE AND ACETAMINOPHEN 5; 325 MG/1; MG/1
1 TABLET ORAL
Status: COMPLETED | OUTPATIENT
Start: 2023-12-12 | End: 2023-12-12

## 2023-12-12 RX ADMIN — KETOROLAC TROMETHAMINE 30 MG: 30 INJECTION, SOLUTION INTRAMUSCULAR at 22:53

## 2023-12-12 RX ADMIN — HYDROCODONE BITARTRATE AND ACETAMINOPHEN 1 TABLET: 5; 325 TABLET ORAL at 23:27

## 2023-12-12 ASSESSMENT — PAIN DESCRIPTION - ORIENTATION: ORIENTATION: LEFT

## 2023-12-12 ASSESSMENT — PAIN SCALES - GENERAL: PAINLEVEL_OUTOF10: 10

## 2023-12-12 ASSESSMENT — LIFESTYLE VARIABLES
HOW OFTEN DO YOU HAVE A DRINK CONTAINING ALCOHOL: 2-3 TIMES A WEEK
HOW MANY STANDARD DRINKS CONTAINING ALCOHOL DO YOU HAVE ON A TYPICAL DAY: 1 OR 2

## 2023-12-12 ASSESSMENT — PAIN - FUNCTIONAL ASSESSMENT: PAIN_FUNCTIONAL_ASSESSMENT: 0-10

## 2023-12-13 NOTE — ED PROVIDER NOTES
bony  spurring. Joint effusions. 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 No obvious acute bony fracture or joint dislocations. XR KNEE LEFT (3 VIEWS)   Final Result   Moderate to severe degenerative changes of the knee joint with joint   effusions. No obvious acute bony fracture or joint dislocations. Voice dictation software was used during the making of this note. This software is not perfect and grammatical and other typographical errors may be present. This note has not been completely proofread for errors.       Maldonado Rodriguez,   12/12/23 [id number]

## 2023-12-13 NOTE — DISCHARGE INSTRUCTIONS
If you do not hear from the orthopedic office call them to schedule your follow-up appointment. Consider wearing an Ace wrap or neoprene sleeve over the knee to provide compression to help decrease the fluid buildup. Take the meloxicam with food once daily as needed for mild to moderate pain. Take the least amount of narcotic pain medicine possible for control of severe pain. Risk of opioid analgesics include, but are not limited to: Overdose they can stop or slow your breathing and lead to death; fractures from falls; drowsiness leading to injury; tolerance, dependence and addiction. You should not operate any motorized vehicles or work from a height greater than ground level when taking opioid analgesics as they increase your fall risks. We would love to help you get a primary care doctor for follow-up after your emergency department visit. Please call [phone] between 7AM - 6PM Monday to Friday. A care navigator will be able to assist you with setting up a doctor close to your home.

## (undated) DEVICE — MINOR SPLIT GENERAL: Brand: MEDLINE INDUSTRIES, INC.

## (undated) DEVICE — ELECTRODE PT RET AD L9FT HI MOIST COND ADH HYDRGEL CORDED

## (undated) DEVICE — BANDAGE,GAUZE,BULKEE II,4.5"X4.1YD,STRL: Brand: MEDLINE